# Patient Record
Sex: MALE | NOT HISPANIC OR LATINO | Employment: OTHER | URBAN - METROPOLITAN AREA
[De-identification: names, ages, dates, MRNs, and addresses within clinical notes are randomized per-mention and may not be internally consistent; named-entity substitution may affect disease eponyms.]

---

## 2023-08-01 ENCOUNTER — HOSPITAL ENCOUNTER (OUTPATIENT)
Dept: DATA CONVERSION | Facility: HOSPITAL | Age: 38
Discharge: HOME | End: 2023-08-01

## 2023-08-01 DIAGNOSIS — G47.33 OBSTRUCTIVE SLEEP APNEA (ADULT) (PEDIATRIC): ICD-10-CM

## 2023-08-01 DIAGNOSIS — G47.10 HYPERSOMNIA, UNSPECIFIED: ICD-10-CM

## 2024-01-09 ENCOUNTER — OFFICE VISIT (OUTPATIENT)
Dept: UROLOGY | Facility: CLINIC | Age: 39
End: 2024-01-09
Payer: COMMERCIAL

## 2024-01-09 DIAGNOSIS — R68.82 DECREASED LIBIDO: Primary | ICD-10-CM

## 2024-01-09 PROCEDURE — 99204 OFFICE O/P NEW MOD 45 MIN: CPT | Performed by: NURSE PRACTITIONER

## 2024-01-09 NOTE — PROGRESS NOTES
"UROLOGIC INITIAL EVALUATION     PROBLEM LIST:  1. Decreased libido  Testosterone           HISTORY OF PRESENT ILLNESS:   Tim Greenwood is a 38 y.o. with AUGUST  Presents today for evaluation and management of fatigue & decreased libido  Reports fatigue, low sex drive x 3 years  Variable ability to get and maintain erections  Works as a , currently on \"carnivore diet\"   Gets about 7 hrs of sleep/night, not feeling rested  Napping when possible  On CPAP since November for sleep apnea  Sleeping better but no improvement in fatigue  Wakes up only to void, NTF 1-2  Hydrating throughout the day, urine is clear  Denies depression  Hx vasectomy    3 kids - ages 5, 6, 11; youngest has cerebral palsy     PAST MEDICAL HISTORY:  No past medical history on file.    PAST SURGICAL HISTORY:  Past Surgical History:   Procedure Laterality Date    OTHER SURGICAL HISTORY  01/05/2022    Vasectomy        ALLERGIES:   Not on File     MEDICATIONS:   No current outpatient medications on file prior to visit.     No current facility-administered medications on file prior to visit.        SOCIAL HISTORY:  Patient     Social History     Socioeconomic History    Marital status: Single     Spouse name: Not on file    Number of children: Not on file    Years of education: Not on file    Highest education level: Not on file   Occupational History    Not on file   Tobacco Use    Smoking status: Not on file    Smokeless tobacco: Not on file   Substance and Sexual Activity    Alcohol use: Not on file    Drug use: Not on file    Sexual activity: Not on file   Other Topics Concern    Not on file   Social History Narrative    Not on file     Social Determinants of Health     Financial Resource Strain: Not on file   Food Insecurity: Not on file   Transportation Needs: Not on file   Physical Activity: Not on file   Stress: Not on file   Social Connections: Not on file   Intimate Partner Violence: Not on file   Housing Stability: Not " on file       FAMILY HISTORY:  No family history on file.    REVIEW OF SYSTEMS:  All systems reviewed, pertinent negatives as noted in HPI.     PHYSICAL EXAM:  There were no vitals taken for this visit.  Constitutional: Well-developed and well-nourished. No distress.    Head: Normocephalic and atraumatic.    Neck: Normal range of motion.     Pulmonary/Chest: Effort normal. No respiratory distress.   Abdominal: Non-distended.  : Deferred.  Integumentary: No rash or lesions visualized.  Musculoskeletal: Normal range of motion.    Neurological: Alert and oriented.  Psychiatric: Normal mood and affect. Thought content normal.      LABORATORY REVIEW:   Lab Results   Component Value Date    BUN 28 (H) 01/06/2023    CREATININE 0.8 01/06/2023    EGFR 115 01/06/2023     01/06/2023    K 4.2 01/06/2023     01/06/2023    CO2 25 01/06/2023    CALCIUM 9.3 01/06/2023      Lab Results   Component Value Date    WBC 3.6 (L) 01/06/2023    RBC 4.64 01/06/2023    HGB 14.1 01/06/2023    HCT 42.1 01/06/2023    MCV 90.7 01/06/2023    MCH 30.4 01/06/2023    MCHC 33.5 01/06/2023     01/06/2023    MPV 9.9 01/06/2023           Assessment:      1. Decreased libido  Testosterone          Tim Greenwood is a 38 y.o. with decreased energy levels, low sex drive x 3 years; JACOB 16 reflecting moderate sexual dysfunction  Testosterone 647 on last labs 1/6/23; not suggestive of hypogonadism  TSH, iron, and CBC also WNL  Has follow up with sleep medicine next week     Plan:   Recheck testosterone, aware this needs to be completed before 10 am  Will continue to work with PCP and other specialists on other possible causes  Discussed availability of psychologist for counselling if desired  RTC pending above results  Encouraged to contact us in the interim with any questions, concerns

## 2024-01-10 ENCOUNTER — LAB (OUTPATIENT)
Dept: LAB | Facility: LAB | Age: 39
End: 2024-01-10
Payer: COMMERCIAL

## 2024-01-10 DIAGNOSIS — R68.82 DECREASED LIBIDO: ICD-10-CM

## 2024-01-10 LAB — TESTOST SERPL-MCNC: 765 NG/DL (ref 240–1000)

## 2024-01-10 PROCEDURE — 84403 ASSAY OF TOTAL TESTOSTERONE: CPT

## 2024-01-10 PROCEDURE — 36415 COLL VENOUS BLD VENIPUNCTURE: CPT

## 2024-02-07 ENCOUNTER — OFFICE VISIT (OUTPATIENT)
Dept: PRIMARY CARE | Facility: CLINIC | Age: 39
End: 2024-02-07
Payer: COMMERCIAL

## 2024-02-07 VITALS
HEART RATE: 74 BPM | BODY MASS INDEX: 26.92 KG/M2 | WEIGHT: 188 LBS | OXYGEN SATURATION: 100 % | SYSTOLIC BLOOD PRESSURE: 102 MMHG | DIASTOLIC BLOOD PRESSURE: 68 MMHG | HEIGHT: 70 IN

## 2024-02-07 DIAGNOSIS — Z13.6 SCREENING FOR HEART DISEASE: ICD-10-CM

## 2024-02-07 DIAGNOSIS — G47.33 OSA ON CPAP: ICD-10-CM

## 2024-02-07 DIAGNOSIS — Z00.00 WELL ADULT EXAM: Primary | ICD-10-CM

## 2024-02-07 DIAGNOSIS — D72.818 OTHER DECREASED WHITE BLOOD CELL (WBC) COUNT: ICD-10-CM

## 2024-02-07 DIAGNOSIS — R53.83 OTHER FATIGUE: ICD-10-CM

## 2024-02-07 PROCEDURE — 1036F TOBACCO NON-USER: CPT | Performed by: NURSE PRACTITIONER

## 2024-02-07 PROCEDURE — 99396 PREV VISIT EST AGE 40-64: CPT | Performed by: NURSE PRACTITIONER

## 2024-02-07 ASSESSMENT — PATIENT HEALTH QUESTIONNAIRE - PHQ9
SUM OF ALL RESPONSES TO PHQ9 QUESTIONS 1 AND 2: 0
1. LITTLE INTEREST OR PLEASURE IN DOING THINGS: NOT AT ALL
2. FEELING DOWN, DEPRESSED OR HOPELESS: NOT AT ALL

## 2024-02-07 ASSESSMENT — PROMIS GLOBAL HEALTH SCALE
RATE_AVERAGE_PAIN: 5
RATE_QUALITY_OF_LIFE: GOOD
RATE_SOCIAL_SATISFACTION: VERY GOOD
EMOTIONAL_PROBLEMS: SOMETIMES
RATE_MENTAL_HEALTH: GOOD
RATE_AVERAGE_FATIGUE: SEVERE
CARRYOUT_PHYSICAL_ACTIVITIES: COMPLETELY
CARRYOUT_SOCIAL_ACTIVITIES: VERY GOOD
RATE_GENERAL_HEALTH: VERY GOOD
RATE_PHYSICAL_HEALTH: EXCELLENT

## 2024-02-07 ASSESSMENT — PAIN SCALES - GENERAL: PAINLEVEL: 0-NO PAIN

## 2024-02-07 NOTE — PROGRESS NOTES
"Subjective   Patient ID: Tim Greenwood is a 38 y.o. male who presents for CPE.    THI Camejo is a 38-year-old male patient who presents for CPE  Patient has family history of esophageal cancer ( father)    Diet is good. Patient works as  and eats very clean and healthy. Takes multivitamin pack daily along with whey protein  As , patient exercises multiple times per day  Former smoker    Pt has been diagnosed with AUGUST and is on CPAP  Continues to have daytime fatigue, worse in the afternoon. Has follow up tomorrow with Dr Castillo (sleep specialist)    Seeing urology for \"men's health\"  Dr Paz  Has low libido and ED  Testosterone was checked and normal    Pt has seen hematology in the past for low wbc (Dr Friedman)  States work up was normal    Review of Systems  Constitutional Symptoms: negative for fever, loss of appetite, headaches, Positive for fatigue.   Eyes: negative for loss and blurring of vision, double vision.   Ear, Nose, Mouth, Throat: negative for hearing loss, tinnitus, nasal congestion, rhinorrhea, nose bleeds, teeth problems, mouth sores, gum disease, dysphagia, sore throat.   Cardiovascular: negative for chest pain/pressure, palpitations, edema, claudication.   Respiratory: negative for shortness of breath, dyspnea on exertion, pain with breathing, coughing.   Breast: negative for tenderness, masses, gynecomastia.   Gastrointestinal: negative for anorexia, indigestion, nausea, vomiting, abdominal pain, change in bowel habits, diarrhea, constipation, hematochezia, melena, blood in stool.    : Negative for urinary or  penile complaints  Musculoskeletal: negative for joint pain, joint swelling, myalgia, cramps.   Integumentary: negative for change in mole, skin trouble or rash.   Neurological: negative for headache, numbness, tingling, weakness, tremors.   Psychiatric: negative for depression, anxiety.   Endocrine: negative for weight gain, heat or cold " "intolerance, polyuria, polydipsia, polyphagia.   Hematologic/Lymphatic: negative for bruising, abnormal bleeding, swollen glands      Objective   /68   Pulse 74   Ht 1.765 m (5' 9.5\")   Wt 85.3 kg (188 lb)   SpO2 100%   BMI 27.36 kg/m²     Physical Exam  General Appearance: Vital Signs have been reviewed. Comfortable. Well nourished, and well developed. He is awake, alert, and oriented and appears his stated age. The patient is cooperative with exam.  Head: Hair pattern reveals a normal pattern for patient's age and The face shows no abnormalities.  Eyes: PERRLA, EOMI, conjunctiva and sclera clear. Extraocular muscle exam reveals EOMI.  Ears, Nose, Mouth, and Throat: EARS: External bilateral ears reveals normal helix, tragus and ear lobe. Bilateral canals are normal. Both tympanic membranes are pearly gray and landmarks normal.   NOSE: Nasal mucosa in both nostrils reveals no polyps, ulcerations, or lesions. Teeth reveal good repair. Posterior pharynx reveals no abnormalities.  Neck: Neck reveals supple, no adenopathy, no thyromegaly, or carotid bruits.  Chest: Lungs are clear to auscultation bilaterally with no wheezes, rales, or rhonchi.  Cardiovascular: RRR without MRG. Bilateral DP pulses are 2+. Extremities reveal no cyanosis, clubbing, or edema.  Abdomen: Abdomen is soft, NT, ND with no masses.  Genitourinary: deferred - sees urology  Musculoskeletal: 5/5 and equal strength in bilateral upper and lower extremities.  Skin: Skin reveals good turgor and no rashes.  Neurological:  Intact and non-focal.  Psychiatric: Patient has appropriate judgement. Patient has good insight. Patient's mood is appropriate.    Assessment/Plan   Diagnoses and all orders for this visit:  Well adult exam  -     CBC and Auto Differential; Future  -     Comprehensive Metabolic Panel; Future  -     Lipid Panel; Future  -     Hemoglobin A1C; Future  -     TSH with reflex to Free T4 if abnormal; Future  Healthy " diet  Exercise  Safety  Bw ordered for further evaluation  See specialists as scheduled  Follow up based on results of bw  AUGUST on CPAP  Other fatigue  -     CBC and Auto Differential; Future  -     Comprehensive Metabolic Panel; Future  -     TSH with reflex to Free T4 if abnormal; Future  Other decreased white blood cell (WBC) count  -     CBC and Auto Differential; Future  -     Comprehensive Metabolic Panel; Future  -     TSH with reflex to Free T4 if abnormal; Future  Screening for heart disease  -     Lipid Panel; Future

## 2024-02-08 ENCOUNTER — OFFICE VISIT (OUTPATIENT)
Dept: SLEEP MEDICINE | Facility: CLINIC | Age: 39
End: 2024-02-08
Payer: COMMERCIAL

## 2024-02-08 VITALS
BODY MASS INDEX: 27.4 KG/M2 | HEART RATE: 80 BPM | DIASTOLIC BLOOD PRESSURE: 54 MMHG | WEIGHT: 185 LBS | OXYGEN SATURATION: 98 % | HEIGHT: 69 IN | SYSTOLIC BLOOD PRESSURE: 108 MMHG

## 2024-02-08 DIAGNOSIS — G47.19 EXCESSIVE DAYTIME SLEEPINESS: ICD-10-CM

## 2024-02-08 DIAGNOSIS — G47.33 OBSTRUCTIVE SLEEP APNEA (ADULT) (PEDIATRIC): Primary | ICD-10-CM

## 2024-02-08 PROCEDURE — 1036F TOBACCO NON-USER: CPT | Performed by: INTERNAL MEDICINE

## 2024-02-08 PROCEDURE — 99213 OFFICE O/P EST LOW 20 MIN: CPT | Performed by: INTERNAL MEDICINE

## 2024-02-08 ASSESSMENT — SLEEP AND FATIGUE QUESTIONNAIRES
HOW LIKELY ARE YOU TO NOD OFF OR FALL ASLEEP WHEN YOU ARE A PASSENGER IN A CAR FOR AN HOUR WITHOUT A BREAK: HIGH CHANCE OF DOZING
HOW LIKELY ARE YOU TO NOD OFF OR FALL ASLEEP WHILE WATCHING TV: MODERATE CHANCE OF DOZING
HOW LIKELY ARE YOU TO NOD OFF OR FALL ASLEEP WHILE SITTING AND READING: HIGH CHANCE OF DOZING
SITING INACTIVE IN A PUBLIC PLACE LIKE A CLASS ROOM OR A MOVIE THEATER: WOULD NEVER DOZE
HOW LIKELY ARE YOU TO NOD OFF OR FALL ASLEEP WHILE SITTING AND TALKING TO SOMEONE: WOULD NEVER DOZE
ESS-CHAD TOTAL SCORE: 14
HOW LIKELY ARE YOU TO NOD OFF OR FALL ASLEEP WHILE LYING DOWN TO REST IN THE AFTERNOON WHEN CIRCUMSTANCES PERMIT: HIGH CHANCE OF DOZING
HOW LIKELY ARE YOU TO NOD OFF OR FALL ASLEEP IN A CAR, WHILE STOPPED FOR A FEW MINUTES IN TRAFFIC: WOULD NEVER DOZE
HOW LIKELY ARE YOU TO NOD OFF OR FALL ASLEEP WHILE SITTING QUIETLY AFTER LUNCH WITHOUT ALCOHOL: HIGH CHANCE OF DOZING

## 2024-02-08 ASSESSMENT — PAIN SCALES - GENERAL: PAINLEVEL: 0-NO PAIN

## 2024-02-08 NOTE — ASSESSMENT & PLAN NOTE
- Tim Greenwood  has sleep apnea and requires treatment.  - Tim Greenwood demonstrates good compliance and benefit from PAP therapy  - Continue Auto PAP 5-15 cmH20 through [facility].  - Order for renewal of PAP supplies placed

## 2024-02-08 NOTE — ASSESSMENT & PLAN NOTE
Continues to demonstrate excessive daytime sleepiness despite sufficient use of his CPAP. Will allow 3 more months of consistent CPAP use and if he still demonstrates hypersomnolence then we discussed doing PSG/MSLT testing to rule out hypersomnolence condition such as narcolepsy

## 2024-02-08 NOTE — PROGRESS NOTES
"  Subjective   Patient ID: Tim Greenwood is a 38 y.o. male who presents for Sleep Apnea, Pap Adherence Followup, and Needs Pap Supplies/new Pap Machine.  HPI    Prior Sleep History:  PSG 8/1/2023, AHI 14.4, O2 saturation herman of 87.6   09/11/2023 TIM is here to discuss the results of his sleep study. Night sleep symptoms: Snoring, nocturnal awakenings 3-4 times a night, waking up gasping or choking, nocturia, night sweats, bruxism, sore throat, dry mouth in the morning, morning headaches. Reports urge to move legs in the morning, occurs during the night or in the evening, movement provides partial relief, worse with rest or inactivity, dreams upon falling asleep, sleep paralysis, denies cataplexy   Daytime symptoms difficulty with concentration, difficulty with irritability, sleepy when driving  He has not noticed an association with the marijuana, CBN or CBD.  He believes his symptoms started prior to his marijuana use     He reports COVID around Thanksgiving 2021     He has a history of boxing and history of concussion playing football, but denies serious head injury     Current Sleep History:      A downloaded compliance report was reviewed and was interpreted by myself as follows:  > 4 hour compliance was 97%, with an average use of 8 hours and 26 minutes, with a residual AHI 3.9 on AutoPap 5 to 15 cm H2O.  95th percentile pressure settings of 12, maximum 14.  95th percentile leak is 16.1.  Maximum leak 115.   He is using a full face mask traditional [employer] Simplus medium mask    Tim Greenwood reports good benefit from his device. Better sleep quality, but still experiencing excessive daytime sleepiness and needs to take a 1-2 hour nap every day    ESS: 14     Review of Systems  Review of systems negative except as per HPI  Objective   /54   Pulse 80   Ht 1.753 m (5' 9\")   Wt 83.9 kg (185 lb)   SpO2 98%   BMI 27.32 kg/m²    PREVIOUS WEIGHTS:  Wt Readings from Last 3 " Encounters:   02/08/24 83.9 kg (185 lb)   02/07/24 85.3 kg (188 lb)   09/11/23 81.6 kg (180 lb)       Physical Exam  PHYSICAL EXAM: GENERAL: alert pleasant and cooperative no acute distress  PSYCH EXAM: alert,oriented, in NAD with a full range of affect, normal behavior and no psychotic features    Lab Results   Component Value Date    IRON 88 01/06/2023    TIBC 277 01/06/2023    FERRITIN 119 01/31/2018        Assessment/Plan   Problem List Items Addressed This Visit             ICD-10-CM    Obstructive sleep apnea (adult) (pediatric) - Primary G47.33     - Tim Greenwood  has sleep apnea and requires treatment.  - Tim Greenwood demonstrates good compliance and benefit from PAP therapy  - Continue Auto PAP 5-15 cmH20 through [facility].  - Order for renewal of PAP supplies placed          Relevant Orders    Positive Airway Pressure (PAP) Therapy    Excessive daytime sleepiness G47.19     Continues to demonstrate excessive daytime sleepiness despite sufficient use of his CPAP. Will allow 3 more months of consistent CPAP use and if he still demonstrates hypersomnolence then we discussed doing PSG/MSLT testing to rule out hypersomnolence condition such as narcolepsy

## 2024-02-10 ENCOUNTER — LAB (OUTPATIENT)
Dept: LAB | Facility: LAB | Age: 39
End: 2024-02-10
Payer: COMMERCIAL

## 2024-02-10 DIAGNOSIS — Z13.6 SCREENING FOR HEART DISEASE: ICD-10-CM

## 2024-02-10 DIAGNOSIS — D72.818 OTHER DECREASED WHITE BLOOD CELL (WBC) COUNT: ICD-10-CM

## 2024-02-10 DIAGNOSIS — Z00.00 WELL ADULT EXAM: ICD-10-CM

## 2024-02-10 DIAGNOSIS — R53.83 OTHER FATIGUE: ICD-10-CM

## 2024-02-10 LAB
ALBUMIN SERPL-MCNC: 4.4 G/DL (ref 3.5–5)
ALP BLD-CCNC: 60 U/L (ref 35–125)
ALT SERPL-CCNC: 35 U/L (ref 5–40)
ANION GAP SERPL CALC-SCNC: 10 MMOL/L
AST SERPL-CCNC: 29 U/L (ref 5–40)
BASOPHILS # BLD AUTO: 0.02 X10*3/UL (ref 0–0.1)
BASOPHILS NFR BLD AUTO: 0.6 %
BILIRUB SERPL-MCNC: 0.9 MG/DL (ref 0.1–1.2)
BUN SERPL-MCNC: 24 MG/DL (ref 8–25)
CALCIUM SERPL-MCNC: 9.1 MG/DL (ref 8.5–10.4)
CHLORIDE SERPL-SCNC: 103 MMOL/L (ref 97–107)
CHOLEST SERPL-MCNC: 208 MG/DL (ref 133–200)
CHOLEST/HDLC SERPL: 2.9 {RATIO}
CO2 SERPL-SCNC: 26 MMOL/L (ref 24–31)
CREAT SERPL-MCNC: 0.9 MG/DL (ref 0.4–1.6)
EGFRCR SERPLBLD CKD-EPI 2021: >90 ML/MIN/1.73M*2
EOSINOPHIL # BLD AUTO: 0.07 X10*3/UL (ref 0–0.7)
EOSINOPHIL NFR BLD AUTO: 2.2 %
ERYTHROCYTE [DISTWIDTH] IN BLOOD BY AUTOMATED COUNT: 14.4 % (ref 11.5–14.5)
EST. AVERAGE GLUCOSE BLD GHB EST-MCNC: 111 MG/DL
GLUCOSE SERPL-MCNC: 96 MG/DL (ref 65–99)
HBA1C MFR BLD: 5.5 %
HCT VFR BLD AUTO: 44.2 % (ref 41–52)
HDLC SERPL-MCNC: 71 MG/DL
HGB BLD-MCNC: 14.9 G/DL (ref 13.5–17.5)
IMM GRANULOCYTES # BLD AUTO: 0 X10*3/UL (ref 0–0.7)
IMM GRANULOCYTES NFR BLD AUTO: 0 % (ref 0–0.9)
LDLC SERPL CALC-MCNC: 127 MG/DL (ref 65–130)
LYMPHOCYTES # BLD AUTO: 1.46 X10*3/UL (ref 1.2–4.8)
LYMPHOCYTES NFR BLD AUTO: 46.1 %
MCH RBC QN AUTO: 29.8 PG (ref 26–34)
MCHC RBC AUTO-ENTMCNC: 33.7 G/DL (ref 32–36)
MCV RBC AUTO: 88 FL (ref 80–100)
MONOCYTES # BLD AUTO: 0.38 X10*3/UL (ref 0.1–1)
MONOCYTES NFR BLD AUTO: 12 %
NEUTROPHILS # BLD AUTO: 1.24 X10*3/UL (ref 1.2–7.7)
NEUTROPHILS NFR BLD AUTO: 39.1 %
NRBC BLD-RTO: 0 /100 WBCS (ref 0–0)
PLATELET # BLD AUTO: 209 X10*3/UL (ref 150–450)
POTASSIUM SERPL-SCNC: 4.9 MMOL/L (ref 3.4–5.1)
PROT SERPL-MCNC: 6.7 G/DL (ref 5.9–7.9)
RBC # BLD AUTO: 5 X10*6/UL (ref 4.5–5.9)
SODIUM SERPL-SCNC: 139 MMOL/L (ref 133–145)
TRIGL SERPL-MCNC: 49 MG/DL (ref 40–150)
TSH SERPL DL<=0.05 MIU/L-ACNC: 1.65 MIU/L (ref 0.27–4.2)
WBC # BLD AUTO: 3.2 X10*3/UL (ref 4.4–11.3)

## 2024-02-10 PROCEDURE — 83036 HEMOGLOBIN GLYCOSYLATED A1C: CPT

## 2024-02-10 PROCEDURE — 84443 ASSAY THYROID STIM HORMONE: CPT

## 2024-02-10 PROCEDURE — 36415 COLL VENOUS BLD VENIPUNCTURE: CPT

## 2024-02-10 PROCEDURE — 80061 LIPID PANEL: CPT

## 2024-02-10 PROCEDURE — 85025 COMPLETE CBC W/AUTO DIFF WBC: CPT

## 2024-02-10 PROCEDURE — 80053 COMPREHEN METABOLIC PANEL: CPT

## 2024-05-09 ENCOUNTER — OFFICE VISIT (OUTPATIENT)
Dept: SLEEP MEDICINE | Facility: CLINIC | Age: 39
End: 2024-05-09
Payer: COMMERCIAL

## 2024-05-09 VITALS
OXYGEN SATURATION: 98 % | HEIGHT: 70 IN | SYSTOLIC BLOOD PRESSURE: 112 MMHG | BODY MASS INDEX: 27.49 KG/M2 | HEART RATE: 73 BPM | DIASTOLIC BLOOD PRESSURE: 68 MMHG | WEIGHT: 192 LBS

## 2024-05-09 DIAGNOSIS — G47.10 HYPERSOMNOLENCE DISORDER: ICD-10-CM

## 2024-05-09 DIAGNOSIS — G47.33 OBSTRUCTIVE SLEEP APNEA (ADULT) (PEDIATRIC): Primary | ICD-10-CM

## 2024-05-09 PROCEDURE — 1036F TOBACCO NON-USER: CPT | Performed by: INTERNAL MEDICINE

## 2024-05-09 PROCEDURE — 99214 OFFICE O/P EST MOD 30 MIN: CPT | Performed by: INTERNAL MEDICINE

## 2024-05-09 RX ORDER — MODAFINIL 100 MG/1
100 TABLET ORAL DAILY
Qty: 14 TABLET | Refills: 0 | Status: SHIPPED | OUTPATIENT
Start: 2024-05-09 | End: 2024-05-23

## 2024-05-09 RX ORDER — BISMUTH SUBSALICYLATE 262 MG
1 TABLET,CHEWABLE ORAL DAILY
COMMUNITY

## 2024-05-09 ASSESSMENT — SLEEP AND FATIGUE QUESTIONNAIRES
HOW LIKELY ARE YOU TO NOD OFF OR FALL ASLEEP WHEN YOU ARE A PASSENGER IN A CAR FOR AN HOUR WITHOUT A BREAK: HIGH CHANCE OF DOZING
HOW LIKELY ARE YOU TO NOD OFF OR FALL ASLEEP WHILE LYING DOWN TO REST IN THE AFTERNOON WHEN CIRCUMSTANCES PERMIT: HIGH CHANCE OF DOZING
SITING INACTIVE IN A PUBLIC PLACE LIKE A CLASS ROOM OR A MOVIE THEATER: SLIGHT CHANCE OF DOZING
HOW LIKELY ARE YOU TO NOD OFF OR FALL ASLEEP IN A CAR, WHILE STOPPED FOR A FEW MINUTES IN TRAFFIC: MODERATE CHANCE OF DOZING
HOW LIKELY ARE YOU TO NOD OFF OR FALL ASLEEP WHILE SITTING QUIETLY AFTER LUNCH WITHOUT ALCOHOL: MODERATE CHANCE OF DOZING
ESS-CHAD TOTAL SCORE: 17
HOW LIKELY ARE YOU TO NOD OFF OR FALL ASLEEP WHILE WATCHING TV: MODERATE CHANCE OF DOZING
HOW LIKELY ARE YOU TO NOD OFF OR FALL ASLEEP WHILE SITTING AND TALKING TO SOMEONE: SLIGHT CHANCE OF DOZING
HOW LIKELY ARE YOU TO NOD OFF OR FALL ASLEEP WHILE SITTING AND READING: HIGH CHANCE OF DOZING

## 2024-05-09 ASSESSMENT — PAIN SCALES - GENERAL: PAINLEVEL: 0-NO PAIN

## 2024-05-09 NOTE — ASSESSMENT & PLAN NOTE
Continues to have persistent hypersomnolence despite optimal use and control of sleep apnea. Will pursue PSG/MSLT testing. Sleep diaries provided and recommended to hand to the tech on day of testing. UDS ordered. Follow-up after testing. I did provide him with a 2 week supply of modafinil, that I recommended he does not take for the 2 weeks leading up to his sleep study. Discussed potential side effects of the medication. Instructed him to discontinue marijuana use as it can mimic hypersomnolence disorders. He reports that he rarely uses marijuana anymore

## 2024-05-09 NOTE — PROGRESS NOTES
"  Subjective   Patient ID: Tim Greenwood is a 38 y.o. male who presents for Sleep Apnea and Pap Adherence Followup.  HPI    Prior Sleep History:  PSG 8/1/2023, AHI 14.4, O2 saturation herman of 87.6   09/11/2023 TIM is here to discuss the results of his sleep study. Night sleep symptoms: Snoring, nocturnal awakenings 3-4 times a night, waking up gasping or choking, nocturia, night sweats, bruxism, sore throat, dry mouth in the morning, morning headaches. Reports urge to move legs in the morning, occurs during the night or in the evening, movement provides partial relief, worse with rest or inactivity, dreams upon falling asleep, sleep paralysis, denies cataplexy.  Daytime symptoms difficulty with concentration, difficulty with irritability, sleepy when driving  He has not noticed an association with the marijuana, CBN or CBD. He believes his symptoms started prior to his marijuana use.    Current Sleep History:  Continues to experience hypersomnolence despite optimal use of PAP therapy  A downloaded compliance report was reviewed and was interpreted by myself as follows:  > 4 hour compliance was 90 %, with an average use of 8 hours and 32 minutes, with a residual AHI 3.8 .     Tim Greenwood reports  good benefit from his device, but still experiences excessive daytime sleepiness      ESS: 17     Review of Systems  Review of systems negative except as per HPI  Objective   /68   Pulse 73   Ht 1.778 m (5' 10\")   Wt 87.1 kg (192 lb)   SpO2 98%   BMI 27.55 kg/m²    PREVIOUS WEIGHTS:  Wt Readings from Last 3 Encounters:   05/09/24 87.1 kg (192 lb)   02/08/24 83.9 kg (185 lb)   02/07/24 85.3 kg (188 lb)       Physical Exam  PHYSICAL EXAM: GENERAL: alert pleasant and cooperative no acute distress  PSYCH EXAM: alert,oriented, in NAD with a full range of affect, normal behavior and no psychotic features    Lab Results   Component Value Date    IRON 88 01/06/2023    TIBC 277 01/06/2023    " FERRITIN 119 01/31/2018        Assessment/Plan   Problem List Items Addressed This Visit             ICD-10-CM    Obstructive sleep apnea (adult) (pediatric) - Primary G47.33     - Tim Greenwood  has sleep apnea and requires treatment.  - Tim Greenwood demonstrates good compliance and benefit from PAP therapy  - Continue Auto PAP 5-15 cmH2O cmH20 through [facility].  - Order for renewal of PAP supplies placed          Relevant Orders    Positive Airway Pressure (PAP) Therapy    Hypersomnolence disorder G47.10     Continues to have persistent hypersomnolence despite optimal use and control of sleep apnea. Will pursue PSG/MSLT testing. Sleep diaries provided and recommended to hand to the tech on day of testing. UDS ordered. Follow-up after testing. I did provide him with a 2 week supply of modafinil, that I recommended he does not take for the 2 weeks leading up to his sleep study. Discussed potential side effects of the medication. Instructed him to discontinue marijuana use as it can mimic hypersomnolence disorders. He reports that he rarely uses marijuana anymore         Relevant Medications    modafinil (Provigil) 100 mg tablet    Other Relevant Orders    In-Center Sleep Study (Sleep Provider Only)    Multiple sleep latency test (Sleep Provider Only)    Drug Screen, Urine With Reflex to Confirmation

## 2024-05-09 NOTE — ASSESSMENT & PLAN NOTE
- Tim Greenwood  has sleep apnea and requires treatment.  - Tim Greenwood demonstrates good compliance and benefit from PAP therapy  - Continue Auto PAP 5-15 cmH2O cmH20 through [facility].  - Order for renewal of PAP supplies placed

## 2024-05-31 ENCOUNTER — APPOINTMENT (OUTPATIENT)
Dept: SLEEP MEDICINE | Facility: CLINIC | Age: 39
End: 2024-05-31
Payer: COMMERCIAL

## 2024-06-20 ENCOUNTER — TELEPHONE (OUTPATIENT)
Dept: SLEEP MEDICINE | Facility: CLINIC | Age: 39
End: 2024-06-20
Payer: COMMERCIAL

## 2024-06-20 DIAGNOSIS — G47.10 HYPERSOMNOLENCE DISORDER: ICD-10-CM

## 2024-06-20 RX ORDER — MODAFINIL 100 MG/1
100 TABLET ORAL DAILY
Qty: 14 TABLET | Refills: 0 | Status: SHIPPED | OUTPATIENT
Start: 2024-06-20 | End: 2024-07-04

## 2024-06-20 NOTE — TELEPHONE ENCOUNTER
----- Message from Mely Jim sent at 6/20/2024  2:40 PM EDT -----  Regarding: Medication Refill  Patient states he can't get Sleep Study Scheduled until August and the modafinil seemed to help would like to know if he can have an extension on the script

## 2024-07-11 DIAGNOSIS — G47.10 HYPERSOMNOLENCE DISORDER: ICD-10-CM

## 2024-07-11 RX ORDER — MODAFINIL 100 MG/1
100 TABLET ORAL DAILY
Qty: 14 TABLET | Refills: 0 | Status: SHIPPED | OUTPATIENT
Start: 2024-07-11 | End: 2024-07-25

## 2024-07-17 ENCOUNTER — APPOINTMENT (OUTPATIENT)
Dept: SLEEP MEDICINE | Facility: CLINIC | Age: 39
End: 2024-07-17
Payer: COMMERCIAL

## 2024-07-29 ENCOUNTER — APPOINTMENT (OUTPATIENT)
Dept: SLEEP MEDICINE | Facility: CLINIC | Age: 39
End: 2024-07-29
Payer: COMMERCIAL

## 2024-09-09 ENCOUNTER — APPOINTMENT (OUTPATIENT)
Dept: SLEEP MEDICINE | Facility: CLINIC | Age: 39
End: 2024-09-09
Payer: COMMERCIAL

## 2024-09-18 ENCOUNTER — APPOINTMENT (OUTPATIENT)
Dept: SLEEP MEDICINE | Facility: CLINIC | Age: 39
End: 2024-09-18
Payer: COMMERCIAL

## 2024-10-22 ENCOUNTER — PROCEDURE VISIT (OUTPATIENT)
Dept: SLEEP MEDICINE | Facility: CLINIC | Age: 39
End: 2024-10-22
Payer: COMMERCIAL

## 2024-10-22 DIAGNOSIS — G47.10 HYPERSOMNOLENCE DISORDER: ICD-10-CM

## 2024-10-23 ENCOUNTER — PROCEDURE VISIT (OUTPATIENT)
Dept: SLEEP MEDICINE | Facility: CLINIC | Age: 39
End: 2024-10-23
Payer: COMMERCIAL

## 2024-10-23 VITALS
SYSTOLIC BLOOD PRESSURE: 112 MMHG | WEIGHT: 191.8 LBS | RESPIRATION RATE: 14 BRPM | HEART RATE: 76 BPM | HEIGHT: 70 IN | OXYGEN SATURATION: 99 % | DIASTOLIC BLOOD PRESSURE: 68 MMHG | BODY MASS INDEX: 27.46 KG/M2

## 2024-10-23 DIAGNOSIS — G47.10 HYPERSOMNOLENCE DISORDER: ICD-10-CM

## 2024-10-23 LAB
AMPHETAMINES UR QL SCN: ABNORMAL
BARBITURATES UR QL SCN: ABNORMAL
BENZODIAZ UR QL SCN: ABNORMAL
BZE UR QL SCN: ABNORMAL
CANNABINOIDS UR QL SCN: ABNORMAL
FENTANYL+NORFENTANYL UR QL SCN: ABNORMAL
METHADONE UR QL SCN: ABNORMAL
OPIATES UR QL SCN: ABNORMAL
OXYCODONE+OXYMORPHONE UR QL SCN: ABNORMAL
PCP UR QL SCN: ABNORMAL

## 2024-10-23 PROCEDURE — 80307 DRUG TEST PRSMV CHEM ANLYZR: CPT

## 2024-10-23 PROCEDURE — 80349 CANNABINOIDS NATURAL: CPT | Performed by: INTERNAL MEDICINE

## 2024-10-23 ASSESSMENT — SLEEP AND FATIGUE QUESTIONNAIRES
HOW LIKELY ARE YOU TO NOD OFF OR FALL ASLEEP WHILE SITTING AND TALKING TO SOMEONE: SLIGHT CHANCE OF DOZING
HOW LIKELY ARE YOU TO NOD OFF OR FALL ASLEEP WHEN YOU ARE A PASSENGER IN A CAR FOR AN HOUR WITHOUT A BREAK: HIGH CHANCE OF DOZING
ESS-CHAD TOTAL SCORE: 15
HOW LIKELY ARE YOU TO NOD OFF OR FALL ASLEEP WHILE SITTING AND READING: MODERATE CHANCE OF DOZING
HOW LIKELY ARE YOU TO NOD OFF OR FALL ASLEEP WHILE LYING DOWN TO REST IN THE AFTERNOON WHEN CIRCUMSTANCES PERMIT: HIGH CHANCE OF DOZING
HOW LIKELY ARE YOU TO NOD OFF OR FALL ASLEEP WHILE WATCHING TV: MODERATE CHANCE OF DOZING
SITING INACTIVE IN A PUBLIC PLACE LIKE A CLASS ROOM OR A MOVIE THEATER: SLIGHT CHANCE OF DOZING
HOW LIKELY ARE YOU TO NOD OFF OR FALL ASLEEP IN A CAR, WHILE STOPPED FOR A FEW MINUTES IN TRAFFIC: SLIGHT CHANCE OF DOZING
HOW LIKELY ARE YOU TO NOD OFF OR FALL ASLEEP WHILE SITTING QUIETLY AFTER LUNCH WITHOUT ALCOHOL: MODERATE CHANCE OF DOZING

## 2024-10-23 NOTE — PROGRESS NOTES
[facility] TECH NOTE:     Patient: Tim Greenwood   MRN//AGE: [id number]  1985  38 y.o.   Technologist: Jeremiah Jackson   Room: 3   Service Date: 10/23/2024        Sleep Testing Location: [facility]: 15    TECHNOLOGIST SLEEP STUDY PROCEDURE NOTE:   This sleep study is being conducted according to the policies and procedures outlined by the AAS accreditation standards.  The sleep study procedure and processes involved during this appointment was explained to the patient/patient’s family, questions were answered. The patient/family verbalized understanding.      The patient is a 38 y.o. year old male scheduled for aMSLT/MWT with montage of:  MSLT . he arrived for his appointment.      The study that was ultimately completed was aMSLT/MWT with montage of:  MSLT .    The full study Was completed.  Patient questionnaires completed?: yes     Consents signed? yes    Brief Study observations: Patient requested and went outside at times.  He stated he is an active person and could not just sit in the lab all day.       Deviation to order/protocol and reason: Tech ran all naps using patient's home mask [employer] Simplus FFM medium at a CPAP pressure of 13cm.  Tech lowered pressure to hope to encourage sleep during the naps as patient stated he is on AutoPAP at home and usually starts at a lower pressure.  Patient was tolerate to start at 13cm.       Other:None    After the procedure, the patient/family was informed to ensure followup with ordering clinician for testing results.      Technologist: Jeremiah Jackson

## 2024-10-23 NOTE — PROGRESS NOTES
[facility] TECH NOTE:     Patient: Tim Greenwood   MRN//AGE: [id number]  1985  38 y.o.   Technologist: William Roger   Room: 3   Service Date: 10/23/2024        Sleep Testing Location: [facility]  Neck [address] 36 cm  [place]: 15    TECHNOLOGIST SLEEP STUDY PROCEDURE NOTE:   This sleep study is being conducted according to the policies and procedures outlined by the AAS accreditation standards.  The sleep study procedure and processes involved during this appointment was explained to the patient/patient’s family, questions were answered. The patient/family verbalized understanding.      The patient is a 38 y.o. year old male scheduled for aCPAP titration     The study that was ultimately completed was aCPAP titration     The full study Was completed.  Patient questionnaires completed?: yes   Consents signed? yes      Initial Fall Risk Screening:     Tim has not fallen in the last 6 months.  Tim does not have a fear of falling. He does not need assistance with sitting, standing, or walking. he does not need assistance walking in his home. he does not need assistance in an unfamiliar setting. The patient is notusing an assistive device.     Brief Study observations: This is a 38 year-old male here for a CPAP/MSLT Study.  The patient had a PSG performed at [facility] 2023 and was dx'ed with AUGUST. His AHI was 14.4.     Deviation to order/protocol and reason: None      If PAP, which was preferred mask/pressure/mode:  Mccloud [employer]kel Simplus size medium.      Other:None    After the procedure, the patient/family was informed to ensure followup with ordering clinician for testing results.      Technologist: William Roger

## 2024-10-29 LAB — CARBOXYTHC UR-MCNC: >500 NG/ML

## 2024-11-12 ENCOUNTER — APPOINTMENT (OUTPATIENT)
Dept: SLEEP MEDICINE | Facility: CLINIC | Age: 39
End: 2024-11-12
Payer: COMMERCIAL

## 2024-11-12 DIAGNOSIS — Z87.820 HISTORY OF MULTIPLE CONCUSSIONS: ICD-10-CM

## 2024-11-12 DIAGNOSIS — G47.10 HYPERSOMNOLENCE DISORDER: ICD-10-CM

## 2024-11-12 DIAGNOSIS — G47.33 OBSTRUCTIVE SLEEP APNEA (ADULT) (PEDIATRIC): ICD-10-CM

## 2024-11-12 DIAGNOSIS — R94.01 ABNORMAL EEG: Primary | ICD-10-CM

## 2024-11-12 DIAGNOSIS — Z79.899 MEDICATION MANAGEMENT: ICD-10-CM

## 2024-11-12 PROCEDURE — 1036F TOBACCO NON-USER: CPT | Performed by: INTERNAL MEDICINE

## 2024-11-12 PROCEDURE — 99214 OFFICE O/P EST MOD 30 MIN: CPT | Performed by: INTERNAL MEDICINE

## 2024-11-12 NOTE — LETTER
"2024     Shayla Berrios, APRN-CNP  [address] [place] [address]  [address]  [place] [address]    Patient: Bashir Greenwood   YOB: 1985   Date of Visit: 2024       Dear Dr. Shayla Berrios, APRN-CNP:    Thank you for referring Bashir Greenwood to me for evaluation. Below are my notes for this consultation.  If you have questions, please do not hesitate to call me. I look forward to following your patient along with you.       Sincerely,     Irwin Castillo MD      CC: No Recipients  ______________________________________________________________________________________         Patient: Tim Greenwood    [id number]  : 1985 -- AGE 39 y.o.    Provider: Irwin Castillo MD     Location [facility]   Service Date: 2024              [facility]  Followup Visit Note  Virtual or Telephone Consent  An interactive audio and video telecommunication system which permits real time communications between the patient (at the originating site) and provider (at the distant site) was utilized to provide this telehealth service.   Verbal consent was requested and obtained from Tim Greenwood on this date, 24 for a telehealth visit.      Subjective  Patient ID: Tim Greenwood \"León" is a 39 y.o. male who presents for Sleep Apnea, Pap Adherence Followup, and Review Sleep Study Results.  HPI    Prior Sleep History:  PSG 2023, AHI 14.4, O2 saturation herman of 87.6   2023 TIM is here to discuss the results of his sleep study. Night sleep symptoms: Snoring, nocturnal awakenings 3-4 times a night, waking up gasping or choking, nocturia, night sweats, bruxism, sore throat, dry mouth in the morning, morning headaches. Reports urge to move legs in the morning, occurs during the night or in the evening, movement provides partial relief, worse with rest or inactivity, dreams upon falling asleep, sleep paralysis, denies " "cataplexy.  Daytime symptoms difficulty with concentration, difficulty with irritability, sleepy when driving  He has not noticed an association with the marijuana, CBN or CBD. He believes his symptoms started prior to his marijuana use.     Continues to experience hypersomnolence despite optimal use of PAP therapy  A downloaded compliance report was reviewed and was interpreted by myself as follows:  > 4 hour compliance was 90 %, with an average use of 8 hours and 32 minutes, with a residual AHI 3.8 .      Tim Greenwood reports  good benefit from his device, but still experiences excessive daytime sleepiness        ESS: 17     Current Sleep History:  Tim \"Bashir\" presents for review of sleep study and discuss management.  He had PSG on PAP therapy followed by MSLT.    10/22/2024: Diagnostic PSG on PAP therapy: Well-managed on current pressure of CPAP 14 cm H2O.  There were no significant periodic limb movements.  Patient fragmented sleep appear to be due to atypical EEG waveforms and stereotypical chewing  10/23/2024: Multiple sleep latency test: Patient had a mean sleep latency of 16.1 with 0 sleep onset REM periods.  Sleep logs were not available for review.  Urine drug screen was positive for marijuana, which the patient has previously disclosed    His youngest daughter had absence seizures when she was little. No other knowledge of absence seizures. His daughter has cerebral palsy and born very premature  ESS: 15     A downloaded compliance report was reviewed and was interpreted by myself as follows:  > 4 hour compliance was 90%, with an average use of 7 hours and 37 minutes, with a residual AHI 2.3 on AutoPap 5 to 15 cm H2O.  95th percentile pressure of 11 cm H2O.  He wears a traditional fullface mask occasionally disconnects from the hose and leaks.  He said it is infrequent but he does continue to find some benefit from PAP therapy although continues to demonstrate persistent hypersomnolence.  " "He is not having any daytime seizure activity.     Tim Greenwood \"Bashir\" reports good benefit from his device.   Review of Systems  Review of systems negative except as per HPI  Objective  There were no vitals taken for this visit.   PREVIOUS WEIGHTS:  Wt Readings from Last 3 Encounters:   10/22/24 87 kg (191 lb 12.8 oz)   05/09/24 87.1 kg (192 lb)   02/08/24 83.9 kg (185 lb)     Physical Exam  PHYSICAL EXAM: GENERAL: alert pleasant and cooperative no acute distress  PSYCH EXAM: alert,oriented, in NAD with a full range of affect, normal behavior and no psychotic features    Lab Results   Component Value Date    IRON 88 01/06/2023    TIBC 277 01/06/2023    FERRITIN 119 01/31/2018        Assessment/Plan  Problem List Items Addressed This Visit             ICD-10-CM    Obstructive sleep apnea (adult) (pediatric) G47.33     Diagnostic PSG on PAP therapy indicated that CPAP was effective at 14 cm H2O         Hypersomnolence disorder G47.10     Review of clinical studies indicate that modafinil is safe in patients with seizure disorder.  He had good clinical benefit with the modafinil.  We did discuss this in the visit.  He did have a positive urine drug screen for marijuana.  Patient reports that he has stopped using marijuana and would like to continue with modafinil.  We did discuss that that would be fine provided that he has a negative urine drug screen which we will order and request that he complete this in the next 72 hours.  Urine drug screen is ordered.  Recommend follow-up in 1 month to review the results of the EEG.  Tim \"Bashir\"  verbalized understanding         Relevant Orders    EEG    History of multiple concussions Z87.820    Abnormal EEG - Primary R94.01     He had abnormal EEG the cause arousals during sleep.  He had stereotypical chewing movements that may be seizure activity.  We did discuss that it could also be artifact due to bruxism.  Patient has history of multiple concussions has " daytime sleepiness with a negative MSLT for narcolepsy, idiopathic hypersomnolence or hypersomnia due to obstructive sleep apnea.  This would fit a clinical history of nocturnal seizures.  Recommend we start with a daytime EEG.  If negative then we would move forward with a 72-hour ambulatory EEG and refer to neurology for further evaluation.         Relevant Orders    EEG

## 2024-11-12 NOTE — ASSESSMENT & PLAN NOTE
"Review of clinical studies indicate that modafinil is safe in patients with seizure disorder.  He had good clinical benefit with the modafinil.  We did discuss this in the visit.  He did have a positive urine drug screen for marijuana.  Patient reports that he has stopped using marijuana and would like to continue with modafinil.  We did discuss that that would be fine provided that he has a negative urine drug screen which we will order and request that he complete this in the next 72 hours.  Urine drug screen is ordered.  Recommend follow-up in 1 month to review the results of the EEG.  Tim \"Bashir\"  verbalized understanding  "

## 2024-11-12 NOTE — PROGRESS NOTES
"     Patient: Tim Greenwood    [id number]  : 1985 -- AGE 39 y.o.    Provider: Irwin Castillo MD     Location [facility]   Service Date: 2024              [facility]  Followup Visit Note  Virtual or Telephone Consent  An interactive audio and video telecommunication system which permits real time communications between the patient (at the originating site) and provider (at the distant site) was utilized to provide this telehealth service.   Verbal consent was requested and obtained from Tim Greenwood on this date, 24 for a telehealth visit.      Subjective   Patient ID: Tim Greenwood \"Bashir\" is a 39 y.o. male who presents for Sleep Apnea, Pap Adherence Followup, and Review Sleep Study Results.  HPI    Prior Sleep History:  PSG 2023, AHI 14.4, O2 saturation herman of 87.6   2023 TIM is here to discuss the results of his sleep study. Night sleep symptoms: Snoring, nocturnal awakenings 3-4 times a night, waking up gasping or choking, nocturia, night sweats, bruxism, sore throat, dry mouth in the morning, morning headaches. Reports urge to move legs in the morning, occurs during the night or in the evening, movement provides partial relief, worse with rest or inactivity, dreams upon falling asleep, sleep paralysis, denies cataplexy.  Daytime symptoms difficulty with concentration, difficulty with irritability, sleepy when driving  He has not noticed an association with the marijuana, CBN or CBD. He believes his symptoms started prior to his marijuana use.     Continues to experience hypersomnolence despite optimal use of PAP therapy  A downloaded compliance report was reviewed and was interpreted by myself as follows:  > 4 hour compliance was 90 %, with an average use of 8 hours and 32 minutes, with a residual AHI 3.8 .      Tim Greenwood reports  good benefit from his device, but still experiences " "excessive daytime sleepiness        ESS: 17     Current Sleep History:  Tim \"Bashir\" presents for review of sleep study and discuss management.  He had PSG on PAP therapy followed by MSLT.    10/22/2024: Diagnostic PSG on PAP therapy: Well-managed on current pressure of CPAP 14 cm H2O.  There were no significant periodic limb movements.  Patient fragmented sleep appear to be due to atypical EEG waveforms and stereotypical chewing  10/23/2024: Multiple sleep latency test: Patient had a mean sleep latency of 16.1 with 0 sleep onset REM periods.  Sleep logs were not available for review.  Urine drug screen was positive for marijuana, which the patient has previously disclosed    His youngest daughter had absence seizures when she was little. No other knowledge of absence seizures. His daughter has cerebral palsy and born very premature  ESS: 15     A downloaded compliance report was reviewed and was interpreted by myself as follows:  > 4 hour compliance was 90%, with an average use of 7 hours and 37 minutes, with a residual AHI 2.3 on AutoPap 5 to 15 cm H2O.  95th percentile pressure of 11 cm H2O.  He wears a traditional fullface mask occasionally disconnects from the hose and leaks.  He said it is infrequent but he does continue to find some benefit from PAP therapy although continues to demonstrate persistent hypersomnolence.  He is not having any daytime seizure activity.     Tim Greenwood \"Bashir\" reports good benefit from his device.   Review of Systems  Review of systems negative except as per HPI  Objective   There were no vitals taken for this visit.   PREVIOUS WEIGHTS:  Wt Readings from Last 3 Encounters:   10/22/24 87 kg (191 lb 12.8 oz)   05/09/24 87.1 kg (192 lb)   02/08/24 83.9 kg (185 lb)     Physical Exam  PHYSICAL EXAM: GENERAL: alert pleasant and cooperative no acute distress  PSYCH EXAM: alert,oriented, in NAD with a full range of affect, normal behavior and no psychotic features    Lab " "Results   Component Value Date    IRON 88 01/06/2023    TIBC 277 01/06/2023    FERRITIN 119 01/31/2018        Assessment/Plan   Problem List Items Addressed This Visit             ICD-10-CM    Obstructive sleep apnea (adult) (pediatric) G47.33     Diagnostic PSG on PAP therapy indicated that CPAP was effective at 14 cm H2O         Hypersomnolence disorder G47.10     Review of clinical studies indicate that modafinil is safe in patients with seizure disorder.  He had good clinical benefit with the modafinil.  We did discuss this in the visit.  He did have a positive urine drug screen for marijuana.  Patient reports that he has stopped using marijuana and would like to continue with modafinil.  We did discuss that that would be fine provided that he has a negative urine drug screen which we will order and request that he complete this in the next 72 hours.  Urine drug screen is ordered.  Recommend follow-up in 1 month to review the results of the EEG.  Tim \"Bashir\"  verbalized understanding         Relevant Orders    EEG    History of multiple concussions Z87.820    Abnormal EEG - Primary R94.01     He had abnormal EEG the cause arousals during sleep.  He had stereotypical chewing movements that may be seizure activity.  We did discuss that it could also be artifact due to bruxism.  Patient has history of multiple concussions has daytime sleepiness with a negative MSLT for narcolepsy, idiopathic hypersomnolence or hypersomnia due to obstructive sleep apnea.  This would fit a clinical history of nocturnal seizures.  Recommend we start with a daytime EEG.  If negative then we would move forward with a 72-hour ambulatory EEG and refer to neurology for further evaluation.         Relevant Orders    EEG          "

## 2024-11-12 NOTE — ASSESSMENT & PLAN NOTE
He had abnormal EEG the cause arousals during sleep.  He had stereotypical chewing movements that may be seizure activity.  We did discuss that it could also be artifact due to bruxism.  Patient has history of multiple concussions has daytime sleepiness with a negative MSLT for narcolepsy, idiopathic hypersomnolence or hypersomnia due to obstructive sleep apnea.  This would fit a clinical history of nocturnal seizures.  Recommend we start with a daytime EEG.  If negative then we would move forward with a 72-hour ambulatory EEG and refer to neurology for further evaluation.

## 2024-11-12 NOTE — ASSESSMENT & PLAN NOTE
Diagnostic PSG on PAP therapy indicated that CPAP was effective at 14 cm H2O. continue current settings

## 2024-11-15 ENCOUNTER — LAB (OUTPATIENT)
Dept: LAB | Facility: LAB | Age: 39
End: 2024-11-15
Payer: COMMERCIAL

## 2024-11-15 DIAGNOSIS — Z79.899 MEDICATION MANAGEMENT: ICD-10-CM

## 2024-11-15 PROCEDURE — 80365 DRUG SCREENING OXYCODONE: CPT

## 2024-11-15 PROCEDURE — 80307 DRUG TEST PRSMV CHEM ANLYZR: CPT

## 2024-11-15 PROCEDURE — 80368 SEDATIVE HYPNOTICS: CPT

## 2024-11-15 PROCEDURE — 82570 ASSAY OF URINE CREATININE: CPT

## 2024-11-15 PROCEDURE — 80373 DRUG SCREENING TRAMADOL: CPT

## 2024-11-15 PROCEDURE — 80361 OPIATES 1 OR MORE: CPT

## 2024-11-15 PROCEDURE — 80354 DRUG SCREENING FENTANYL: CPT

## 2024-11-15 PROCEDURE — 80358 DRUG SCREENING METHADONE: CPT

## 2024-11-15 PROCEDURE — 80346 BENZODIAZEPINES1-12: CPT

## 2024-11-16 LAB
AMPHETAMINES UR QL SCN: NORMAL
BARBITURATES UR QL SCN: NORMAL
BZE UR QL SCN: NORMAL
CANNABINOIDS UR QL SCN: NORMAL
CREAT UR-MCNC: 32 MG/DL (ref 20–370)
PCP UR QL SCN: NORMAL

## 2024-11-18 DIAGNOSIS — G47.10 HYPERSOMNOLENCE DISORDER: ICD-10-CM

## 2024-11-18 RX ORDER — MODAFINIL 100 MG/1
100 TABLET ORAL DAILY
Qty: 30 TABLET | Refills: 2 | Status: SHIPPED | OUTPATIENT
Start: 2024-11-18 | End: 2025-02-16

## 2024-12-30 ENCOUNTER — APPOINTMENT (OUTPATIENT)
Dept: PRIMARY CARE | Facility: CLINIC | Age: 39
End: 2024-12-30
Payer: COMMERCIAL

## 2024-12-31 ENCOUNTER — APPOINTMENT (OUTPATIENT)
Dept: PRIMARY CARE | Facility: CLINIC | Age: 39
End: 2024-12-31
Payer: COMMERCIAL

## 2025-01-09 ENCOUNTER — APPOINTMENT (OUTPATIENT)
Dept: PRIMARY CARE | Facility: CLINIC | Age: 40
End: 2025-01-09
Payer: COMMERCIAL

## 2025-01-09 VITALS
WEIGHT: 185 LBS | TEMPERATURE: 98 F | OXYGEN SATURATION: 98 % | BODY MASS INDEX: 26.48 KG/M2 | DIASTOLIC BLOOD PRESSURE: 74 MMHG | HEART RATE: 93 BPM | SYSTOLIC BLOOD PRESSURE: 118 MMHG

## 2025-01-09 DIAGNOSIS — Z13.220 LIPID SCREENING: ICD-10-CM

## 2025-01-09 DIAGNOSIS — N52.9 ERECTILE DYSFUNCTION, UNSPECIFIED ERECTILE DYSFUNCTION TYPE: ICD-10-CM

## 2025-01-09 DIAGNOSIS — R25.2 CRAMP AND SPASM: Primary | ICD-10-CM

## 2025-01-09 PROCEDURE — 99214 OFFICE O/P EST MOD 30 MIN: CPT | Performed by: STUDENT IN AN ORGANIZED HEALTH CARE EDUCATION/TRAINING PROGRAM

## 2025-01-09 PROCEDURE — 93000 ELECTROCARDIOGRAM COMPLETE: CPT | Performed by: STUDENT IN AN ORGANIZED HEALTH CARE EDUCATION/TRAINING PROGRAM

## 2025-01-09 PROCEDURE — 1036F TOBACCO NON-USER: CPT | Performed by: STUDENT IN AN ORGANIZED HEALTH CARE EDUCATION/TRAINING PROGRAM

## 2025-01-09 ASSESSMENT — ENCOUNTER SYMPTOMS
FEVER: 0
LIGHT-HEADEDNESS: 0
RHINORRHEA: 0
CHILLS: 0
SLEEP DISTURBANCE: 0
POLYDIPSIA: 1
MYALGIAS: 1
FREQUENCY: 0
DYSPHORIC MOOD: 0
COUGH: 0
SHORTNESS OF BREATH: 1
DIZZINESS: 0
PALPITATIONS: 0
SINUS PAIN: 0
FATIGUE: 0
DIARRHEA: 0
DYSURIA: 0
HEADACHES: 0
VOMITING: 0
WHEEZING: 0
NAUSEA: 0
SINUS PRESSURE: 0
CONSTIPATION: 0
NERVOUS/ANXIOUS: 0
WOUND: 0
ARTHRALGIAS: 0

## 2025-01-09 ASSESSMENT — PAIN SCALES - GENERAL: PAINLEVEL_OUTOF10: 0-NO PAIN

## 2025-01-09 NOTE — PROGRESS NOTES
"Subjective   Patient ID: Tim Greenwood \"Bashir\" is a 39 y.o. male who presents for Spasms (Muscle spasms/cramps in legs, back, chest, side of abdomen x 4 months/Worse in legs, very painful, pain is getting worse, pt is a  and he stated that it happens after working on one part of the body).    Here today with chief complaint of spasms and tightness in bilateral legs.  Has been getting bad cramps everywhere after working out, but worst in legs.  Cramps are taking an unusually long time to go away.  Has also noticed new onset erectile dysfunction around the same time period.  Has been noticing increased sweating, some increased dyspnea.     No family history of thyroid disease, diabetes.       Review of Systems   Constitutional:  Negative for chills, fatigue and fever.   HENT:  Negative for congestion, hearing loss, rhinorrhea, sinus pressure, sinus pain and tinnitus.    Eyes:  Negative for visual disturbance.   Respiratory:  Positive for shortness of breath. Negative for cough and wheezing.    Cardiovascular:  Negative for chest pain, palpitations and leg swelling.   Gastrointestinal:  Negative for constipation, diarrhea, nausea and vomiting.   Endocrine: Positive for heat intolerance, polydipsia and polyuria. Negative for cold intolerance.   Genitourinary:  Negative for dysuria, frequency and urgency.   Musculoskeletal:  Positive for myalgias. Negative for arthralgias.   Skin:  Negative for pallor, rash and wound.   Neurological:  Negative for dizziness, light-headedness and headaches.   Psychiatric/Behavioral:  Negative for dysphoric mood and sleep disturbance. The patient is not nervous/anxious.        Objective     Visit Vitals  /74   Pulse 93   Temp 36.7 °C (98 °F)   Wt 83.9 kg (185 lb)   SpO2 98%   BMI 26.48 kg/m²   Smoking Status Former   BSA 2.04 m²         Physical Exam  Constitutional:       Appearance: Normal appearance.   HENT:      Head: Normocephalic and atraumatic.      " Right Ear: Tympanic membrane, ear canal and external ear normal.      Left Ear: Tympanic membrane, ear canal and external ear normal.      Nose: Nose normal.      Mouth/Throat:      Mouth: Mucous membranes are moist.      Pharynx: Oropharynx is clear.   Eyes:      Extraocular Movements: Extraocular movements intact.      Conjunctiva/sclera: Conjunctivae normal.      Pupils: Pupils are equal, round, and reactive to light.   Cardiovascular:      Rate and Rhythm: Normal rate and regular rhythm.      Pulses: Normal pulses.      Heart sounds: Normal heart sounds.   Pulmonary:      Effort: Pulmonary effort is normal.      Breath sounds: Normal breath sounds.   Abdominal:      General: There is no distension.      Palpations: Abdomen is soft.      Tenderness: There is no abdominal tenderness.   Musculoskeletal:         General: Normal range of motion.   Skin:     General: Skin is warm and dry.   Neurological:      Mental Status: He is alert and oriented to person, place, and time. Mental status is at baseline.   Psychiatric:         Mood and Affect: Mood normal.         Behavior: Behavior normal.         Assessment & Plan  Cramp and spasm    Orders:    Comprehensive metabolic panel; Future    CBC; Future    Magnesium; Future    Sedimentation Rate; Future    C-reactive protein; Future    Tsh With Reflex To Free T4 If Abnormal; Future    Hemoglobin A1c; Future    ECG 12 lead (Clinic Performed)  Differential could include electrolyte disturbance, PAD.  Given patient also having polyuria, polydipsia it would be worth considering diabetes as a possibility even though his lifestyle would point away from this.  EKG was essentially normal.   Lipid screening    Orders:    Comprehensive metabolic panel; Future    CBC; Future    Lipid panel; Future    Erectile dysfunction, unspecified erectile dysfunction type    Orders:    Tsh With Reflex To Free T4 If Abnormal; Future    Hemoglobin A1c; Future    ECG 12 lead (Clinic Performed)  In  an otherwise healthy person, this might point to an underlying vascular issue.  Might consider MEY if current workup proves unrevealing.      Reviewed and approved by KIMBER STRINGER on 1/9/25 at 8:22 PM.

## 2025-01-13 ENCOUNTER — LAB (OUTPATIENT)
Dept: LAB | Facility: LAB | Age: 40
End: 2025-01-13
Payer: COMMERCIAL

## 2025-01-13 DIAGNOSIS — Z13.220 LIPID SCREENING: ICD-10-CM

## 2025-01-13 DIAGNOSIS — N52.9 ERECTILE DYSFUNCTION, UNSPECIFIED ERECTILE DYSFUNCTION TYPE: ICD-10-CM

## 2025-01-13 DIAGNOSIS — R25.2 CRAMP AND SPASM: ICD-10-CM

## 2025-01-13 LAB
ALBUMIN SERPL BCP-MCNC: 4.3 G/DL (ref 3.4–5)
ALP SERPL-CCNC: 41 U/L (ref 33–120)
ALT SERPL W P-5'-P-CCNC: 50 U/L (ref 10–52)
ANION GAP SERPL CALCULATED.3IONS-SCNC: 10 MMOL/L (ref 10–20)
AST SERPL W P-5'-P-CCNC: 30 U/L (ref 9–39)
BILIRUB SERPL-MCNC: 1 MG/DL (ref 0–1.2)
BUN SERPL-MCNC: 18 MG/DL (ref 6–23)
CALCIUM SERPL-MCNC: 9.1 MG/DL (ref 8.6–10.3)
CHLORIDE SERPL-SCNC: 103 MMOL/L (ref 98–107)
CHOLEST SERPL-MCNC: 158 MG/DL (ref 0–199)
CHOLEST/HDLC SERPL: 2.2 {RATIO}
CO2 SERPL-SCNC: 30 MMOL/L (ref 21–32)
CREAT SERPL-MCNC: 0.99 MG/DL (ref 0.5–1.3)
CRP SERPL-MCNC: <0.1 MG/DL
EGFRCR SERPLBLD CKD-EPI 2021: >90 ML/MIN/1.73M*2
ERYTHROCYTE [DISTWIDTH] IN BLOOD BY AUTOMATED COUNT: 14.6 % (ref 11.5–14.5)
ERYTHROCYTE [SEDIMENTATION RATE] IN BLOOD BY WESTERGREN METHOD: <1 MM/H (ref 0–15)
EST. AVERAGE GLUCOSE BLD GHB EST-MCNC: 88 MG/DL
GLUCOSE SERPL-MCNC: 88 MG/DL (ref 74–99)
HBA1C MFR BLD: 4.7 %
HCT VFR BLD AUTO: 45.9 % (ref 41–52)
HDLC SERPL-MCNC: 73.2 MG/DL
HGB BLD-MCNC: 15.6 G/DL (ref 13.5–17.5)
LDLC SERPL CALC-MCNC: 72 MG/DL
MAGNESIUM SERPL-MCNC: 2.28 MG/DL (ref 1.6–2.4)
MCH RBC QN AUTO: 31.5 PG (ref 26–34)
MCHC RBC AUTO-ENTMCNC: 34 G/DL (ref 32–36)
MCV RBC AUTO: 93 FL (ref 80–100)
NON HDL CHOLESTEROL: 85 MG/DL (ref 0–149)
NRBC BLD-RTO: 0 /100 WBCS (ref 0–0)
PLATELET # BLD AUTO: 230 X10*3/UL (ref 150–450)
POTASSIUM SERPL-SCNC: 4.6 MMOL/L (ref 3.5–5.3)
PROT SERPL-MCNC: 6.4 G/DL (ref 6.4–8.2)
RBC # BLD AUTO: 4.95 X10*6/UL (ref 4.5–5.9)
SODIUM SERPL-SCNC: 138 MMOL/L (ref 136–145)
TRIGL SERPL-MCNC: 63 MG/DL (ref 0–149)
TSH SERPL-ACNC: 1.91 MIU/L (ref 0.44–3.98)
VLDL: 13 MG/DL (ref 0–40)
WBC # BLD AUTO: 3.8 X10*3/UL (ref 4.4–11.3)

## 2025-01-13 PROCEDURE — 86140 C-REACTIVE PROTEIN: CPT

## 2025-01-13 PROCEDURE — 80053 COMPREHEN METABOLIC PANEL: CPT

## 2025-01-13 PROCEDURE — 84443 ASSAY THYROID STIM HORMONE: CPT

## 2025-01-13 PROCEDURE — 83735 ASSAY OF MAGNESIUM: CPT

## 2025-01-13 PROCEDURE — 83036 HEMOGLOBIN GLYCOSYLATED A1C: CPT

## 2025-01-13 PROCEDURE — 85652 RBC SED RATE AUTOMATED: CPT

## 2025-01-13 PROCEDURE — 85027 COMPLETE CBC AUTOMATED: CPT

## 2025-01-13 PROCEDURE — 80061 LIPID PANEL: CPT

## 2025-01-17 ENCOUNTER — PATIENT MESSAGE (OUTPATIENT)
Dept: PRIMARY CARE | Facility: CLINIC | Age: 40
End: 2025-01-17
Payer: COMMERCIAL

## 2025-01-17 DIAGNOSIS — N52.9 ERECTILE DYSFUNCTION, UNSPECIFIED ERECTILE DYSFUNCTION TYPE: Primary | ICD-10-CM

## 2025-01-20 RX ORDER — TADALAFIL 5 MG/1
5 TABLET ORAL DAILY PRN
Qty: 12 TABLET | Refills: 3 | Status: SHIPPED | OUTPATIENT
Start: 2025-01-20 | End: 2025-02-19

## 2025-01-21 ENCOUNTER — HOSPITAL ENCOUNTER (OUTPATIENT)
Dept: NEUROLOGY | Facility: HOSPITAL | Age: 40
Discharge: HOME | End: 2025-01-21
Payer: COMMERCIAL

## 2025-01-21 DIAGNOSIS — R94.01 ABNORMAL EEG: ICD-10-CM

## 2025-01-21 DIAGNOSIS — G47.10 HYPERSOMNOLENCE DISORDER: ICD-10-CM

## 2025-01-21 PROCEDURE — 95819 EEG AWAKE AND ASLEEP: CPT

## 2025-01-21 PROCEDURE — 95819 EEG AWAKE AND ASLEEP: CPT | Performed by: PSYCHIATRY & NEUROLOGY

## 2025-01-23 ENCOUNTER — TELEPHONE (OUTPATIENT)
Dept: SLEEP MEDICINE | Facility: HOSPITAL | Age: 40
End: 2025-01-23
Payer: COMMERCIAL

## 2025-01-23 ENCOUNTER — PATIENT MESSAGE (OUTPATIENT)
Dept: SLEEP MEDICINE | Facility: HOSPITAL | Age: 40
End: 2025-01-23
Payer: COMMERCIAL

## 2025-01-23 DIAGNOSIS — G47.10 HYPERSOMNOLENCE DISORDER: ICD-10-CM

## 2025-01-23 NOTE — TELEPHONE ENCOUNTER
Pt returned call and left VM explaining that [employer] messaging will be okay for results. Sent message.

## 2025-01-23 NOTE — TELEPHONE ENCOUNTER
----- Message from Irwin Castillo sent at 1/22/2025  3:04 PM EST -----  His EEG is negative. We can prescribe modafinil, but he needs to have a negative UDS to prescribe controlled substances as discussed. If he tests positive then he will need to see a different provider if he would like to pursue medication for this  ----- Message -----  From: Interface, Onbase - Scan In  Sent: 1/21/2025   1:41 PM EST  To: Irwin Castillo MD

## 2025-01-23 NOTE — TELEPHONE ENCOUNTER
Called patient and left VM regarding the availability of result.  Sleep nurse phone number [phone] - given to call back for results and plan going forward.

## 2025-01-27 RX ORDER — MODAFINIL 100 MG/1
100 TABLET ORAL DAILY
Qty: 30 TABLET | Refills: 2 | Status: SHIPPED | OUTPATIENT
Start: 2025-01-27 | End: 2025-04-27

## 2025-01-31 ENCOUNTER — TELEMEDICINE (OUTPATIENT)
Dept: UROLOGY | Facility: CLINIC | Age: 40
End: 2025-01-31
Payer: COMMERCIAL

## 2025-01-31 DIAGNOSIS — Z98.52 AZOOSPERMIA AFTER VASECTOMY: Primary | ICD-10-CM

## 2025-01-31 DIAGNOSIS — Z72.89 ENGAGES IN VAPING: ICD-10-CM

## 2025-01-31 PROCEDURE — 99214 OFFICE O/P EST MOD 30 MIN: CPT | Performed by: UROLOGY

## 2025-01-31 PROCEDURE — 99406 BEHAV CHNG SMOKING 3-10 MIN: CPT | Performed by: UROLOGY

## 2025-01-31 NOTE — PROGRESS NOTES
"Virtual or Telephone Consent    An interactive audio and video telecommunication system which permits real time communications between the patient (at the originating site) and provider (at the distant site) was utilized to provide this telehealth service.   Verbal consent was requested and obtained from Tim Greenwood on this date, 01/31/25 for a telehealth visit.   Subjective   Tim Greenwood \"Bashir\" is a 39 y.o. male who is for a vasectomy reversal consult     Vasectomy was in: 2017-  Previous children 2 kids- ages 6,7.  Youngest has CP.  Postoperative complications: none      Currently : yes  Partners name: Magy  Partner age: 35  Partner's previous pregnancies: no  Previous testosterone or anabolic use:    Sleep medicine X 3 and PCP notes reviewed.     He saw Tom FORBES-CNP    Shx: Knee and hand surgery    Past Surgical History:   Procedure Laterality Date    OTHER SURGICAL HISTORY  01/05/2022    Vasectomy     LABS:    Lab Results   Component Value Date    TESTOSTERONE 765 01/10/2024       CBC        Component  Ref Range & Units 2 wk ago  (1/13/25)   WBC  4.4 - 11.3 x10*3/uL 3.8 Low    nRBC  0.0 - 0.0 /100 WBCs 0.0   RBC  4.50 - 5.90 x10*6/uL 4.95   Hemoglobin  13.5 - 17.5 g/dL 15.6   Hematocrit  41.0 - 52.0 % 45.9   MCV  80 - 100 fL 93   MCH  26.0 - 34.0 pg 31.5   MCHC  32.0 - 36.0 g/dL 34.0   RDW  11.5 - 14.5 % 14.6 High    Platelets  150 - 450 x10*3/uL 230        Comprehensive metabolic panel        Component  Ref Range & Units 2 wk ago  (1/13/25)   Glucose  74 - 99 mg/dL 88   Sodium  136 - 145 mmol/L 138   Potassium  3.5 - 5.3 mmol/L 4.6   Chloride  98 - 107 mmol/L 103   Bicarbonate  21 - 32 mmol/L 30   Anion Gap  10 - 20 mmol/L 10   Urea Nitrogen  6 - 23 mg/dL 18   Creatinine  0.50 - 1.30 mg/dL 0.99   eGFR  >60 mL/min/1.73m*2 >90   Calcium  8.6 - 10.3 mg/dL 9.1   Albumin  3.4 - 5.0 g/dL 4.3   Alkaline Phosphatase  33 - 120 U/L 41   Total Protein  6.4 - 8.2 g/dL 6.4 "   AST  9 - 39 U/L 30   Bilirubin, Total  0.0 - 1.2 mg/dL 1.0   ALT  10 - 52 U/L 50        Lipid panel           Component  Ref Range & Units 2 wk ago 11 mo ago 2 yr ago   Cholesterol  0 - 199 mg/dL 158 208 High  R 149 R   HDL-Cholesterol  mg/dL 73.2 71.0 R, CM 58 R, CM   Cholesterol/HDL Ratio 2.2 2.9 R, CM 2.6 R, CM   LDL Calculated  <=99 mg/dL 72 127 R 79 R   VLDL  0 - 40 mg/dL 13     Triglycerides  0 - 149 mg/dL 63 49 R 60 R   Non HDL Cholesterol  0 - 149 mg/dL 85             Your medication list            Accurate as of January 31, 2025 10:49 AM. If you have any questions, ask your nurse or doctor.                CONTINUE taking these medications        Instructions Last Dose Given Next Dose Due   modafinil 100 mg tablet  Commonly known as: Provigil      Take 1 tablet (100 mg) by mouth once daily.       multivitamin tablet           tadalafil 5 mg tablet  Commonly known as: Cialis      Take 1 tablet (5 mg) by mouth once daily as needed for erectile dysfunction.                No Known Allergies     Exam  CONSTITUTIONAL:        No acute distress    HEAD:        Normocephalic and atraumatic    CHEST / RESPIRATORY      no excess work of breathing, no respiratory distress,    ABDOMEN / GASTROINTESTINAL:        Abdomen nondistended      Assessment   #Azoospermia vs Vasectomy.   The patient was counseled regarding his options regarding fertility after vasectomy. Specifically we talked about retrieving sperm for assisted reproductive technologies vs doing a vasectomy reversal. We discussed both vaso-vasostomy and vaso-epidymostomy, their success rates and complications, and the chances of needing either procedure depending on sperm findings. Also discussed CHANDRA vs TESE for sperm extraction, including risks, benefits, or alternatives.     -will contact us to proceed with Vasectomy Reversal +/- TESE  -Package Pricing reviewed.     #Smoking Cessation   -I spent over 3 minutes of the visit counseling the patient in  regards to cessation of vaping.     Scribe Attestation  By signing my name below, I, Elizabeth Wallace attest that this documentation has been prepared under the direction and in the presence of Deidre Riojas MD. All medical record entries made by the Scribe were at my direction or personally dictated by me. I have reviewed the chart and agree that the record accurately reflects my personal performance of the history, physical exam, discussion and plan.

## 2025-02-17 ENCOUNTER — PREP FOR PROCEDURE (OUTPATIENT)
Dept: UROLOGY | Facility: HOSPITAL | Age: 40
End: 2025-02-17
Payer: COMMERCIAL

## 2025-02-17 ENCOUNTER — HOSPITAL ENCOUNTER (OUTPATIENT)
Facility: HOSPITAL | Age: 40
Setting detail: OUTPATIENT SURGERY
End: 2025-02-17
Attending: UROLOGY | Admitting: UROLOGY
Payer: COMMERCIAL

## 2025-02-17 DIAGNOSIS — Z98.52 AZOOSPERMIA AFTER VASECTOMY: Primary | ICD-10-CM

## 2025-02-17 RX ORDER — CEFAZOLIN SODIUM 2 G/100ML
2 INJECTION, SOLUTION INTRAVENOUS ONCE
OUTPATIENT
Start: 2025-02-17 | End: 2025-02-17

## 2025-03-03 ENCOUNTER — APPOINTMENT (OUTPATIENT)
Dept: PRIMARY CARE | Facility: CLINIC | Age: 40
End: 2025-03-03
Payer: COMMERCIAL

## 2025-03-12 ENCOUNTER — APPOINTMENT (OUTPATIENT)
Dept: PRIMARY CARE | Facility: CLINIC | Age: 40
End: 2025-03-12
Payer: COMMERCIAL

## 2025-03-18 ENCOUNTER — APPOINTMENT (OUTPATIENT)
Dept: PRIMARY CARE | Facility: CLINIC | Age: 40
End: 2025-03-18
Payer: COMMERCIAL

## 2025-03-18 PROBLEM — G56.00 CARPAL TUNNEL SYNDROME: Status: ACTIVE | Noted: 2023-01-05

## 2025-03-18 PROBLEM — M23.91 INTERNAL DERANGEMENT OF RIGHT KNEE: Status: ACTIVE | Noted: 2022-02-18

## 2025-03-18 PROBLEM — J30.2 SEASONAL ALLERGIES: Status: ACTIVE | Noted: 2022-06-04

## 2025-03-18 PROBLEM — R06.83 SNORING: Status: ACTIVE | Noted: 2023-01-25

## 2025-03-18 PROBLEM — J06.9 ACUTE URI: Status: ACTIVE | Noted: 2023-05-31

## 2025-03-18 PROBLEM — M25.561 RIGHT KNEE PAIN: Status: ACTIVE | Noted: 2022-01-24

## 2025-03-18 PROBLEM — Z86.2 HISTORY OF ANEMIA: Status: ACTIVE | Noted: 2023-01-05

## 2025-03-18 PROBLEM — K42.9 UMBILICAL HERNIA: Status: ACTIVE | Noted: 2025-03-18

## 2025-03-18 PROBLEM — R68.82 DECREASED LIBIDO: Status: ACTIVE | Noted: 2023-01-05

## 2025-03-18 PROBLEM — M79.643 PAIN OF HAND: Status: ACTIVE | Noted: 2022-07-12

## 2025-03-18 PROBLEM — S83.241A TEAR OF MEDIAL MENISCUS OF RIGHT KNEE, CURRENT: Status: ACTIVE | Noted: 2025-03-18

## 2025-03-18 PROBLEM — M79.641 BILATERAL HAND PAIN: Status: ACTIVE | Noted: 2022-07-12

## 2025-03-18 PROBLEM — M25.469 KNEE SWELLING: Status: ACTIVE | Noted: 2022-01-24

## 2025-03-18 PROBLEM — M79.642 BILATERAL HAND PAIN: Status: ACTIVE | Noted: 2022-07-12

## 2025-03-18 PROBLEM — R53.83 FATIGUE: Status: ACTIVE | Noted: 2023-01-05

## 2025-03-19 ENCOUNTER — HOSPITAL ENCOUNTER (OUTPATIENT)
Dept: RADIOLOGY | Facility: CLINIC | Age: 40
Discharge: HOME | End: 2025-03-19
Payer: COMMERCIAL

## 2025-03-19 ENCOUNTER — OFFICE VISIT (OUTPATIENT)
Dept: PRIMARY CARE | Facility: CLINIC | Age: 40
End: 2025-03-19
Payer: COMMERCIAL

## 2025-03-19 VITALS
SYSTOLIC BLOOD PRESSURE: 102 MMHG | OXYGEN SATURATION: 98 % | DIASTOLIC BLOOD PRESSURE: 70 MMHG | WEIGHT: 192 LBS | HEART RATE: 84 BPM | BODY MASS INDEX: 27.49 KG/M2 | HEIGHT: 70 IN

## 2025-03-19 DIAGNOSIS — G89.29 CHRONIC PAIN OF BOTH KNEES: ICD-10-CM

## 2025-03-19 DIAGNOSIS — D72.818 OTHER DECREASED WHITE BLOOD CELL (WBC) COUNT: ICD-10-CM

## 2025-03-19 DIAGNOSIS — M25.561 CHRONIC PAIN OF BOTH KNEES: ICD-10-CM

## 2025-03-19 DIAGNOSIS — Z13.29 SCREENING FOR THYROID DISORDER: ICD-10-CM

## 2025-03-19 DIAGNOSIS — G47.33 OSA (OBSTRUCTIVE SLEEP APNEA): ICD-10-CM

## 2025-03-19 DIAGNOSIS — M25.562 CHRONIC PAIN OF BOTH KNEES: ICD-10-CM

## 2025-03-19 DIAGNOSIS — Z11.59 SCREENING FOR VIRAL DISEASE: ICD-10-CM

## 2025-03-19 DIAGNOSIS — D72.819 LEUKOPENIA, UNSPECIFIED TYPE: ICD-10-CM

## 2025-03-19 DIAGNOSIS — R79.9 ABNORMAL FINDING OF BLOOD CHEMISTRY, UNSPECIFIED: ICD-10-CM

## 2025-03-19 DIAGNOSIS — Z13.1 SCREENING FOR DIABETES MELLITUS: ICD-10-CM

## 2025-03-19 DIAGNOSIS — Z12.5 SCREENING FOR MALIGNANT NEOPLASM OF PROSTATE: ICD-10-CM

## 2025-03-19 DIAGNOSIS — Z00.00 WELLNESS EXAMINATION: Primary | ICD-10-CM

## 2025-03-19 DIAGNOSIS — R25.2 MUSCLE CRAMPS: ICD-10-CM

## 2025-03-19 DIAGNOSIS — Z13.6 ENCOUNTER FOR SCREENING FOR CARDIOVASCULAR DISORDERS: ICD-10-CM

## 2025-03-19 PROCEDURE — 99396 PREV VISIT EST AGE 40-64: CPT | Performed by: FAMILY MEDICINE

## 2025-03-19 PROCEDURE — 73564 X-RAY EXAM KNEE 4 OR MORE: CPT | Mod: LEFT SIDE | Performed by: RADIOLOGY

## 2025-03-19 PROCEDURE — 99214 OFFICE O/P EST MOD 30 MIN: CPT | Performed by: FAMILY MEDICINE

## 2025-03-19 PROCEDURE — 99386 PREV VISIT NEW AGE 40-64: CPT | Performed by: FAMILY MEDICINE

## 2025-03-19 PROCEDURE — 73564 X-RAY EXAM KNEE 4 OR MORE: CPT | Mod: LT

## 2025-03-19 PROCEDURE — 3008F BODY MASS INDEX DOCD: CPT | Performed by: FAMILY MEDICINE

## 2025-03-19 PROCEDURE — 99214 OFFICE O/P EST MOD 30 MIN: CPT | Mod: 25 | Performed by: FAMILY MEDICINE

## 2025-03-19 PROCEDURE — 1036F TOBACCO NON-USER: CPT | Performed by: FAMILY MEDICINE

## 2025-03-19 RX ORDER — CYCLOBENZAPRINE HCL 10 MG
10 TABLET ORAL NIGHTLY PRN
Qty: 30 TABLET | Refills: 0 | Status: SHIPPED | OUTPATIENT
Start: 2025-03-19 | End: 2025-05-18

## 2025-03-19 RX ORDER — NAPROXEN 500 MG/1
500 TABLET ORAL 2 TIMES DAILY PRN
Qty: 60 TABLET | Refills: 0 | Status: SHIPPED | OUTPATIENT
Start: 2025-03-19 | End: 2025-06-17

## 2025-03-19 ASSESSMENT — ENCOUNTER SYMPTOMS
OCCASIONAL FEELINGS OF UNSTEADINESS: 0
LOSS OF SENSATION IN FEET: 0
DEPRESSION: 0

## 2025-03-19 ASSESSMENT — PATIENT HEALTH QUESTIONNAIRE - PHQ9
SUM OF ALL RESPONSES TO PHQ9 QUESTIONS 1 AND 2: 0
2. FEELING DOWN, DEPRESSED OR HOPELESS: NOT AT ALL
1. LITTLE INTEREST OR PLEASURE IN DOING THINGS: NOT AT ALL

## 2025-03-19 ASSESSMENT — COLUMBIA-SUICIDE SEVERITY RATING SCALE - C-SSRS
1. IN THE PAST MONTH, HAVE YOU WISHED YOU WERE DEAD OR WISHED YOU COULD GO TO SLEEP AND NOT WAKE UP?: NO
2. HAVE YOU ACTUALLY HAD ANY THOUGHTS OF KILLING YOURSELF?: NO
6. HAVE YOU EVER DONE ANYTHING, STARTED TO DO ANYTHING, OR PREPARED TO DO ANYTHING TO END YOUR LIFE?: NO

## 2025-03-19 ASSESSMENT — PAIN SCALES - GENERAL: PAINLEVEL_OUTOF10: 4

## 2025-03-19 NOTE — PROGRESS NOTES
39-year presents to establish care for yearly physical new complaints    Health Maintenance:  Eats a varied and healthy diet.  Exercises regularly.  Does not drink, smoke, use illicit substances.  Otherwise up-to-date on all routine health maintenance screenings.  Due for immunizations.  Due for yearly lab work.    Muscle spasms:  Patient's been experiencing muscle spasms generalized in different parts of his body throughout the recent history.  Previous physician did order some routine lab work and electrolyte levels and magnesium levels were normal, inflammatory markers were not elevated.    Sleep apnea:  Patient currently treated by sleep medicine utilize modafinil, CPAP however does not notice any clinical benefit with CPAP    Leukopenia:  Hematology workup around 7 years ago did not show any obvious pathologic causes presumed to be likely inherited leukopenia however was requested to repeat follow-up and so he is due for a follow-up with hematology most recent CBC did show persistent leukopenia    Knee pain:  Status post meniscectomy bilaterally has been having some increasing pain in his left knee without prior trauma or injury      All pertinent positive symptoms are included in history of present illness.    All other systems have been reviewed and are negative and noncontributory to this patient's current ailments.      CONSTITUTIONAL - INAD. Not ill appearing.  SKIN - No lesions or rashes visualized. Good skin turgor.  HEENT- Head is atraumatic and normocephalic. Nasal turbinates are nonerythematous and without drainage. .  RESP - CTAB. No wheezing, rhonchi, or crackles.   CARDIAC - RRR. No murmurs, gallops, or rubs.  ABDOMEN - Soft, nontender, nondistended. No organomegaly.  NEURO - CNs 2-12 grossly intact.  PSYCH - Normal mood and affect  MUSCULOSKELETAL-mild numbness to palpation over the patellar tendon in the left knee    1. AUGUST (obstructive sleep apnea)  Continue follow-up with sleep medicine.  Could  consider inspire device if CPAP is ineffective    2. Wellness examination (Primary)  Health and wellness topics discussed today.  Recommended eating a varied and healthy diet and made dietary recommendations, also discussed exercise and exercising regularly 30 minutes a day 3 days a week.  Immunizations recommended and updated.  Health screening guidelines discussed with patient including possible things such as colonoscopies, mammograms, prostate screenings, lung cancer screenings, bone densitometry, and other wellness topics.  Yearly lab work reviewed  today.      10. Leukopenia, unspecified type  Workup by hematology  - Referral To Hematology and Oncology; Future    11. Chronic pain of both knees  We had a long discussion in regards to your knee pain.  We discussed knee arthritis as well as meniscus tears today.      We will start off by treating your knee conservatively (AKA non-operatively).  We gave you a prescription for physical therapy to work on increasing the range of motion and strength in the knee.     We offered you an injection of steroids into your knee.  I explained to you the risks and benefits of a steroid injection into your knee.      X-rays of the knee were ordered/reviewed today.    We will see you back in 4-6 weeks to reevaluate your knee pain. If you are still having pain at that time, we would then need to order an MRI to look for possible meniscus tears and assess for cartilage damage in the knee, as well as provide a referral to orthopedic surgery. We will see you back in 4-6 weeks, or sooner if necessary. Please call with any questions or problems.    - Referral to Physical Therapy; Future  - naproxen (Naprosyn) 500 mg tablet; Take 1 tablet (500 mg) by mouth 2 times a day as needed for mild pain (1 - 3) (pain).  Dispense: 60 tablet; Refill: 0  - XR knee left 4+ views; Future    12. Muscle cramps  Trial tonic water with quinine.  Can attempt Flexeril as needed for muscle spasms.   Incidence of multiple muscle spasm/pain.  Please reach out and let me know and I will order electrolyte levels as well as a CK level.    - cyclobenzaprine (Flexeril) 10 mg tablet; Take 1 tablet (10 mg) by mouth as needed at bedtime for muscle spasms.  Dispense: 30 tablet; Refill: 0

## 2025-03-21 NOTE — RESULT ENCOUNTER NOTE
X-ray of the knee reveals very mild degenerative changes in the knee including medial compartment narrowing and bone spurring.  Continue conservative management follow-up with orthopedic surgery if not improving

## 2025-04-01 ENCOUNTER — APPOINTMENT (OUTPATIENT)
Dept: HEMATOLOGY/ONCOLOGY | Facility: CLINIC | Age: 40
End: 2025-04-01
Payer: COMMERCIAL

## 2025-04-14 ENCOUNTER — APPOINTMENT (OUTPATIENT)
Dept: RADIOLOGY | Facility: HOSPITAL | Age: 40
End: 2025-04-14
Payer: COMMERCIAL

## 2025-04-16 ENCOUNTER — APPOINTMENT (OUTPATIENT)
Dept: RADIOLOGY | Facility: HOSPITAL | Age: 40
End: 2025-04-16
Payer: COMMERCIAL

## 2025-04-21 ENCOUNTER — LAB (OUTPATIENT)
Dept: LAB | Facility: CLINIC | Age: 40
End: 2025-04-21
Payer: COMMERCIAL

## 2025-04-21 ENCOUNTER — HOSPITAL ENCOUNTER (OUTPATIENT)
Dept: RADIOLOGY | Facility: HOSPITAL | Age: 40
Discharge: HOME | End: 2025-04-21
Payer: COMMERCIAL

## 2025-04-21 DIAGNOSIS — D72.819 LEUKOPENIA, UNSPECIFIED TYPE: ICD-10-CM

## 2025-04-21 LAB
ALBUMIN SERPL BCP-MCNC: 4.5 G/DL (ref 3.4–5)
ALP SERPL-CCNC: 64 U/L (ref 33–120)
ALT SERPL W P-5'-P-CCNC: 45 U/L (ref 10–52)
ANION GAP SERPL CALC-SCNC: 14 MMOL/L (ref 10–20)
AST SERPL W P-5'-P-CCNC: 32 U/L (ref 9–39)
BASOPHILS # BLD AUTO: 0.04 X10*3/UL (ref 0–0.1)
BASOPHILS NFR BLD AUTO: 0.7 %
BILIRUB SERPL-MCNC: 0.6 MG/DL (ref 0–1.2)
BUN SERPL-MCNC: 24 MG/DL (ref 6–23)
CALCIUM SERPL-MCNC: 9.2 MG/DL (ref 8.6–10.3)
CHLORIDE SERPL-SCNC: 102 MMOL/L (ref 98–107)
CO2 SERPL-SCNC: 24 MMOL/L (ref 21–32)
CREAT SERPL-MCNC: 0.87 MG/DL (ref 0.5–1.3)
EGFRCR SERPLBLD CKD-EPI 2021: >90 ML/MIN/1.73M*2
EOSINOPHIL # BLD AUTO: 0.09 X10*3/UL (ref 0–0.7)
EOSINOPHIL NFR BLD AUTO: 1.5 %
ERYTHROCYTE [DISTWIDTH] IN BLOOD BY AUTOMATED COUNT: 13.4 % (ref 11.5–14.5)
FERRITIN SERPL-MCNC: 124 NG/ML (ref 20–300)
FOLATE SERPL-MCNC: 20.2 NG/ML
GLUCOSE SERPL-MCNC: 106 MG/DL (ref 74–99)
HCT VFR BLD AUTO: 43.7 % (ref 41–52)
HGB BLD-MCNC: 15.4 G/DL (ref 13.5–17.5)
IMM GRANULOCYTES # BLD AUTO: 0.01 X10*3/UL (ref 0–0.7)
IMM GRANULOCYTES NFR BLD AUTO: 0.2 % (ref 0–0.9)
IRON SATN MFR SERPL: 15 % (ref 25–45)
IRON SERPL-MCNC: 54 UG/DL (ref 35–150)
LYMPHOCYTES # BLD AUTO: 1.38 X10*3/UL (ref 1.2–4.8)
LYMPHOCYTES NFR BLD AUTO: 23.2 %
MCH RBC QN AUTO: 31.4 PG (ref 26–34)
MCHC RBC AUTO-ENTMCNC: 35.2 G/DL (ref 32–36)
MCV RBC AUTO: 89 FL (ref 80–100)
MONOCYTES # BLD AUTO: 0.69 X10*3/UL (ref 0.1–1)
MONOCYTES NFR BLD AUTO: 11.6 %
NEUTROPHILS # BLD AUTO: 3.75 X10*3/UL (ref 1.2–7.7)
NEUTROPHILS NFR BLD AUTO: 62.8 %
NRBC BLD-RTO: 0 /100 WBCS (ref 0–0)
PLATELET # BLD AUTO: 213 X10*3/UL (ref 150–450)
POTASSIUM SERPL-SCNC: 3.8 MMOL/L (ref 3.5–5.3)
PROT SERPL-MCNC: 6.8 G/DL (ref 6.4–8.2)
RBC # BLD AUTO: 4.9 X10*6/UL (ref 4.5–5.9)
SODIUM SERPL-SCNC: 136 MMOL/L (ref 136–145)
TIBC SERPL-MCNC: 350 UG/DL (ref 240–445)
UIBC SERPL-MCNC: 296 UG/DL (ref 110–370)
VIT B12 SERPL-MCNC: 697 PG/ML (ref 211–911)
WBC # BLD AUTO: 6 X10*3/UL (ref 4.4–11.3)

## 2025-04-21 PROCEDURE — 83550 IRON BINDING TEST: CPT

## 2025-04-21 PROCEDURE — 36415 COLL VENOUS BLD VENIPUNCTURE: CPT

## 2025-04-21 PROCEDURE — 82728 ASSAY OF FERRITIN: CPT

## 2025-04-21 PROCEDURE — 81450 HL NEO GSAP 5-50DNA/DNA&RNA: CPT

## 2025-04-21 PROCEDURE — 82746 ASSAY OF FOLIC ACID SERUM: CPT

## 2025-04-21 PROCEDURE — 80053 COMPREHEN METABOLIC PANEL: CPT

## 2025-04-21 PROCEDURE — 85025 COMPLETE CBC W/AUTO DIFF WBC: CPT

## 2025-04-21 PROCEDURE — 76700 US EXAM ABDOM COMPLETE: CPT | Performed by: RADIOLOGY

## 2025-04-21 PROCEDURE — 76700 US EXAM ABDOM COMPLETE: CPT

## 2025-04-21 PROCEDURE — 82607 VITAMIN B-12: CPT

## 2025-04-24 ENCOUNTER — OFFICE VISIT (OUTPATIENT)
Dept: HEMATOLOGY/ONCOLOGY | Facility: CLINIC | Age: 40
End: 2025-04-24
Payer: COMMERCIAL

## 2025-04-24 VITALS
WEIGHT: 188.16 LBS | RESPIRATION RATE: 18 BRPM | SYSTOLIC BLOOD PRESSURE: 125 MMHG | OXYGEN SATURATION: 97 % | HEIGHT: 69 IN | BODY MASS INDEX: 27.87 KG/M2 | DIASTOLIC BLOOD PRESSURE: 70 MMHG | TEMPERATURE: 98.1 F | HEART RATE: 89 BPM

## 2025-04-24 DIAGNOSIS — D72.819 LEUKOPENIA, UNSPECIFIED TYPE: Primary | ICD-10-CM

## 2025-04-24 PROCEDURE — 3008F BODY MASS INDEX DOCD: CPT | Performed by: INTERNAL MEDICINE

## 2025-04-24 PROCEDURE — 99205 OFFICE O/P NEW HI 60 MIN: CPT | Performed by: INTERNAL MEDICINE

## 2025-04-24 PROCEDURE — 99215 OFFICE O/P EST HI 40 MIN: CPT | Performed by: INTERNAL MEDICINE

## 2025-04-24 ASSESSMENT — PAIN SCALES - GENERAL: PAINLEVEL_OUTOF10: 0-NO PAIN

## 2025-04-24 NOTE — PROGRESS NOTES
Pt seen in office today for anew patient visit with Dr. Pat Garza for management of leukopenia.He has been referred to our office by Dr. Sheppard.  He is without complaints today and denies pain, though states he feels tired.    Medications, pharmacy preference and allergies were reviewed with patient and updated in the medical record by MD.     Per orders, labs and a CT a/p were completed on 4/21/25 and results are to be reviewed in office today by MD with patient. He is to RTC in 3 months with labs prior to his visit.    Our contact information was given to patient and they were encouraged to contact us with any questions or concerns.    Patient verbalized understanding and agreement regarding discussed information via verbal feedback. Pt escorted to scheduling.

## 2025-04-24 NOTE — PROGRESS NOTES
"Patient ID: Bashir Greenwood is a 39 y.o. male.  Referring Physician: Papa Sheppard DO  [id number] [place] [address]  Primary Care Provider: Papa Sheppard DO  Referral Reason: leukopenia    Subjective:  fatigue    Heme/Onc History:  04/24/25: initial hematology visit        Past Medical History: Medical History[1]  Social History:   Social History     Socioeconomic History    Marital status: Single     Spouse name: Not on file    Number of children: Not on file    Years of education: Not on file    Highest education level: Not on file   Occupational History    Not on file   Tobacco Use    Smoking status: Former     Types: Cigarettes    Smokeless tobacco: Never   Vaping Use    Vaping status: Never Used   Substance and Sexual Activity    Alcohol use: Never    Drug use: Never    Sexual activity: Not on file   Other Topics Concern    Not on file   Social History Narrative    Not on file     Social Drivers of Health     Financial Resource Strain: Not on file   Food Insecurity: Not on file   Transportation Needs: Not on file   Physical Activity: Not on file   Stress: Not on file   Social Connections: Not on file   Intimate Partner Violence: Not on file   Housing Stability: Not on file     Surgical History: Surgical History[2]  Family History: Family History[3]   reports that he has quit smoking. His smoking use included cigarettes. He has never used smokeless tobacco.  Oncology Family history: Cancer-related family history includes Cancer in his father.    Review Of Systems:  As stated per in HPI; otherwise all other 12 point ROS are negative    Physical Exam:  /70 (BP Location: Left arm, Patient Position: Sitting, BP Cuff Size: Adult long)   Pulse 89   Temp 36.7 °C (98.1 °F) (Temporal)   Resp 18   Ht (S) 1.75 m (5' 8.9\")   Wt 85.3 kg (188 lb 2.6 oz)   SpO2 97%   BMI 27.87 kg/m²   BSA: 2.04 meters squared  General: awake/alert/oriented x3, no distress, alert and cooperative  Head: Short hair fully " covering scalp. Symmetric facial expressions  Eyes: PERRL, EOMI, clear sclera, eyebrows present.  Ears/Nose/Mouth/Throat:  Oral mucous membranes moist. No oral ulcers. No palpable pre/post-auricular lymph nodes  Neck: No palpable cervical chain lymph nodes  Respiratory: unlabored breathing on room air, good chest expansion, thorax symmetric  Cardio: Regular rate and rhythm, normal S1 and S2, radial pulses symmetric  GI: Nondistended, soft, non-tender abdomen  Musculoskeletal: Normal muscle bulk and tone, ROM intact, no joint swelling.  Rises from chair and walks unassisted.  Extremities: No ankle swelling, no arm or leg wounds  Neuro: Alert, cognition intact, speech normal. Facial expressions symmetric.  No motor deficits noted. Sensation intact to touch and hot/cold.   Able to stand from seated position unassisted and walks around the room unassisted.  Psychological: Appropriate mood and behavior.  Skin: Warm and dry, no lesions, no rashes    Results:  Diagnostic Results   Lab Results   Component Value Date    WBC 6.0 04/21/2025    HGB 15.4 04/21/2025    HCT 43.7 04/21/2025    MCV 89 04/21/2025     04/21/2025     Lab Results   Component Value Date    CALCIUM 9.2 04/21/2025     04/21/2025    K 3.8 04/21/2025    CO2 24 04/21/2025     04/21/2025    BUN 24 (H) 04/21/2025    CREATININE 0.87 04/21/2025    ALT 45 04/21/2025    AST 32 04/21/2025     Current Medications[4]     Assessment/Plan:  ? Leukopenia:    Sicne 2018. Wbc is normal now. He used to drink heavily until 2021. It could be due to BM suppression due to ETOH. Abd US does not show HSM    NGS is pending    He has iron def due to low iron st. Oral iron every day for 3 months is recommended    RTC in 3 months with labs    Diagnoses and all orders for this visit:  Leukopenia, unspecified type  -     Iron and TIBC; Future  -     Vitamin B12; Future  -     Ferritin; Future  -     Comprehensive Metabolic Panel; Future  -     Folate; Future  -      CBC and Auto Differential; Future  -     Myeloid Malignancies NGS Panel; Future  -     US abdomen complete; Future  -     Referral To Hematology and Oncology  -     Clinic Appointment Request; Future  -     CBC and Auto Differential; Future  -     Comprehensive metabolic panel; Future  -     Iron and TIBC; Future  -     Vitamin B12; Future  -     Ferritin; Future  -     TSH; Future  -     Vitamin D 25-Hydroxy,Total (for eval of Vitamin D levels); Future       Performance Status: Symptomatic; fully ambulatory    I spent more than 60 minutes for the patient today, including face-to-face conversation, pre-visit preparation, post-visit orders, and others.   Pat Garza MD                              [1] No past medical history on file.  [2]   Past Surgical History:  Procedure Laterality Date    OTHER SURGICAL HISTORY  01/05/2022    Vasectomy   [3]   Family History  Problem Relation Name Age of Onset    Cancer Father     [4]   Current Outpatient Medications:     cyclobenzaprine (Flexeril) 10 mg tablet, Take 1 tablet (10 mg) by mouth as needed at bedtime for muscle spasms., Disp: 30 tablet, Rfl: 0    modafinil (Provigil) 100 mg tablet, Take 1 tablet (100 mg) by mouth once daily., Disp: 30 tablet, Rfl: 2    multivitamin tablet, Take 1 tablet by mouth once daily., Disp: , Rfl:     naproxen (Naprosyn) 500 mg tablet, Take 1 tablet (500 mg) by mouth 2 times a day as needed for mild pain (1 - 3) (pain)., Disp: 60 tablet, Rfl: 0

## 2025-04-25 LAB
ELECTRONICALLY SIGNED BY: NORMAL
MYELOID NGS RESULTS: NORMAL

## 2025-04-28 ENCOUNTER — CLINICAL SUPPORT (OUTPATIENT)
Dept: PREADMISSION TESTING | Facility: HOSPITAL | Age: 40
End: 2025-04-28
Payer: COMMERCIAL

## 2025-04-28 DIAGNOSIS — Z98.52 AZOOSPERMIA AFTER VASECTOMY: ICD-10-CM

## 2025-04-28 RX ORDER — FERROUS SULFATE 325(65) MG
325 TABLET ORAL
COMMUNITY

## 2025-05-01 ENCOUNTER — PRE-ADMISSION TESTING (OUTPATIENT)
Dept: PREADMISSION TESTING | Facility: HOSPITAL | Age: 40
End: 2025-05-01
Payer: COMMERCIAL

## 2025-05-12 ENCOUNTER — PRE-ADMISSION TESTING (OUTPATIENT)
Dept: PREADMISSION TESTING | Facility: HOSPITAL | Age: 40
End: 2025-05-12
Payer: COMMERCIAL

## 2025-05-12 NOTE — CPM/PAT NURSE NOTE
"CPM/PAT Nurse Note      Name: Tim Greenwood (Tim Greenwood \"Bashir\")  /Age: 1985/39 y.o.       Medical History[1]    Surgical History[2]    Patient Sexual activity questions deferred to the physician.    Family History[3]    Allergies[4]    Prior to Admission medications    Medication Sig Start Date End Date Taking? Authorizing Provider   cyclobenzaprine (Flexeril) 10 mg tablet Take 1 tablet (10 mg) by mouth as needed at bedtime for muscle spasms. 3/19/25 5/18/25  Papa Sheppard DO   ferrous sulfate 325 mg (65 mg elemental) tablet Take 1 tablet (325 mg) by mouth once daily with breakfast.    Historical Provider, MD   modafinil (Provigil) 100 mg tablet Take 1 tablet (100 mg) by mouth once daily. 25  Irwin Castillo MD   multivitamin tablet Take 1 tablet by mouth once daily.    Historical Provider, MD   naproxen (Naprosyn) 500 mg tablet Take 1 tablet (500 mg) by mouth 2 times a day as needed for mild pain (1 - 3) (pain). 3/19/25 6/17/25  DO NELY Martinez ROS     DASI Risk Score      Flowsheet Row Questionnaire Series Submission from 2025 in [facility] with Generic Provider Saqib   Can you take care of yourself (eat, dress, bathe, or use toilet)?  2.75  filed at 2025   Can you walk indoors, such as around your house? 1.75  filed at 2025   Can you walk a block or two on level ground?  2.75  filed at 2025   Can you climb a flight of stairs or walk up a hill? 5.5  filed at 2025   Can you run a short distance? 8  filed at 2025   Can you do light work around the house like dusting or washing dishes? 2.7  filed at 2025   Can you do moderate work around the house like vacuuming, sweeping floors or carrying groceries? 3.5  filed at 2025   Can you do heavy work around the house like scrubbing floors or lifting and moving heavy furniture?  8  filed at 2025   Can you do yard work like " raking leaves, weeding or pushing a mower? 4.5  filed at 02/17/2025 2056   Can you have sexual relations? 5.25  filed at 02/17/2025 2056   Can you participate in moderate recreational activities like golf, bowling, dancing, doubles tennis or throwing a baseball or football? 6  filed at 02/17/2025 2056   Can you participate in strenous sports like swimming, singles tennis, football, basketball, or skiing? 7.5  filed at 02/17/2025 2056   DASI SCORE 58.2  filed at 02/17/2025 2056   METS Score (Will be calculated only when all the questions are answered) 9.9  filed at 02/17/2025 2056          Caprini DVT Assessment    No data to display       Modified Frailty Index    No data to display       [id number] Stroke Risk Points  Current as of just now        N/A 0 to 9 Points:      Last Change: N/A          The KNR5HK2-QHRb risk score (Lip MODESTO, et al. 2009. © 2010 [employer]) quantifies the risk of stroke for a patient with atrial fibrillation. For patients without atrial fibrillation or under the age of 18 this score appears as N/A. Higher score values generally indicate higher risk of stroke.        This score is not applicable to this patient. Components are not calculated.          Revised Cardiac Risk Index    No data to display       Apfel Simplified Score    No data to display       Risk Analysis Index Results This Encounter    No data found in the last 10 encounters.       Prodigy: High Risk  Total Score: 8              Prodigy Gender Score          ARISCAT Score for Postoperative Pulmonary Complications    No data to display       Gracia Perioperative Risk for Myocardial Infarction or Cardiac Arrest (CARISSA)    No data to display         Nurse Plan of Action: RN screening call complete.  Reviewed allergies, medications and pharmacy, medical, surgical and social history with patient.  Chart updated.                 [1]   Past Medical History:  Diagnosis Date    Anxiety     Azoospermia after  vasectomy     Plan: Vasectomy Reversal; Sperm Extraction 5/15/25    Leukopenia     AUGUST on CPAP     Overweight    [2]   Past Surgical History:  Procedure Laterality Date    CARPAL TUNNEL RELEASE  2024    TOENAIL EXCISION  2022    VASECTOMY  2017    WISDOM TOOTH EXTRACTION  2009   [3]   Family History  Problem Relation Name Age of Onset    Cancer Mother Jeannette Timo     Alcohol abuse Mother Jeannette Linolesli     Cancer Father Alfredo Greenwood     Alcohol abuse Brother Tyson Greenwood    [4] No Known Allergies

## 2025-05-22 ENCOUNTER — OFFICE VISIT (OUTPATIENT)
Dept: PRIMARY CARE | Facility: CLINIC | Age: 40
End: 2025-05-22
Payer: COMMERCIAL

## 2025-05-22 VITALS
WEIGHT: 192 LBS | BODY MASS INDEX: 28.44 KG/M2 | SYSTOLIC BLOOD PRESSURE: 124 MMHG | HEART RATE: 78 BPM | DIASTOLIC BLOOD PRESSURE: 74 MMHG | OXYGEN SATURATION: 98 % | HEIGHT: 69 IN

## 2025-05-22 DIAGNOSIS — G47.33 OSA (OBSTRUCTIVE SLEEP APNEA): ICD-10-CM

## 2025-05-22 DIAGNOSIS — M35.3 POLYMYALGIA (MULTI): Primary | ICD-10-CM

## 2025-05-22 DIAGNOSIS — R53.83 OTHER FATIGUE: ICD-10-CM

## 2025-05-22 DIAGNOSIS — R25.2 MUSCLE CRAMPS: ICD-10-CM

## 2025-05-22 PROCEDURE — 3008F BODY MASS INDEX DOCD: CPT | Performed by: FAMILY MEDICINE

## 2025-05-22 PROCEDURE — 99214 OFFICE O/P EST MOD 30 MIN: CPT | Performed by: FAMILY MEDICINE

## 2025-05-22 RX ORDER — TIZANIDINE HYDROCHLORIDE 4 MG/1
4 CAPSULE, GELATIN COATED ORAL 3 TIMES DAILY
Qty: 90 CAPSULE | Refills: 0 | Status: SHIPPED | OUTPATIENT
Start: 2025-05-22 | End: 2025-06-21

## 2025-05-22 ASSESSMENT — PAIN SCALES - GENERAL: PAINLEVEL_OUTOF10: 0-NO PAIN

## 2025-05-22 NOTE — PROGRESS NOTES
39-year presents to clinic for follow-up    Muscle cramp/spasms:  Patient continues to experience muscle cramps and spasms.  Cyclobenzaprine at nighttime did seem to help but during the day he continues to experience muscle cramps and spasms in different areas of his body primarily his thighs, right upper quadrant of his abdomen especially associated with physical activity.  Has not done formal physical therapy yet.  Lab work thus far has been unrevealing all electrolyte levels have been within normal limits.  Hematology workup was unrevealing other than low percent saturation for which she was given oral iron supplementation which has not seemed to help his cramping so far.  Endorses chronic fatigue.    Has AUGUST cannot tolerate CPAP    All pertinent positive symptoms are included in history of present illness.    All other systems have been reviewed and are negative and noncontributory to this patient's current ailments.      CONSTITUTIONAL - INAD. Not ill appearing.  SKIN - No lesions or rashes visualized. No jaundice visualized.  HEENT- Atraumatic, normocephalic, no scleral icterus, external nares are not erythematous and without drainage, no neck masses visualized, oropharynx visualized and is without erythema or exudate  RESP - respiration not labored   CARDIAC - no grade 6 systolic murmurs auscultated  ABDOMEN - nondistended.  NEURO- CNs II-XII grossly intact        1. Polymyalgia (Multi) (Primary)  Unsure etiology all workup thus far has been unrevealing CT abdomen was unrevealing at Emergency Department visit.  Will get arthritis panel to workup for any autoimmune or rheumatologic condition, trial Zanaflex, refer to urology rheumatology and pain medicine  - Rheumatoid Factor; Future  - C-Reactive Protein; Future  - Sedimentation Rate; Future  - Uric Acid; Future  - SONAL with Reflex to KAREN; Future  - Citrulline Antibody, IgG; Future  - CK; Future  - Referral to Pain Medicine; Future  - tiZANidine (Zanaflex) 4  mg capsule; Take 1 capsule (4 mg) by mouth 3 times a day.  Dispense: 90 capsule; Refill: 0  - Rheumatoid Factor  - C-Reactive Protein  - Sedimentation Rate  - Uric Acid  - SONAL with Reflex to KAREN  - Citrulline Antibody, IgG  - CK  - Referral to Neurology; Future  - Referral to Rheumatology; Future    2. Muscle cramps    - Rheumatoid Factor; Future  - C-Reactive Protein; Future  - Sedimentation Rate; Future  - Uric Acid; Future  - SONAL with Reflex to KAREN; Future  - Citrulline Antibody, IgG; Future  - CK; Future  - Referral to Pain Medicine; Future  - tiZANidine (Zanaflex) 4 mg capsule; Take 1 capsule (4 mg) by mouth 3 times a day.  Dispense: 90 capsule; Refill: 0  - Rheumatoid Factor  - C-Reactive Protein  - Sedimentation Rate  - Uric Acid  - SONAL with Reflex to KAREN  - Citrulline Antibody, IgG  - CK  - Referral to Neurology; Future  - Referral to Rheumatology; Future    3. Other fatigue    - Testosterone; Future  - Testosterone  - Referral to Neurology; Future  - Referral to Rheumatology; Future    4. AUGUST (obstructive sleep apnea)    - Referral to Adult Sleep Medicine; Future

## 2025-05-24 LAB
ANA SER QL IF: NORMAL
CCP IGG SERPL-ACNC: NORMAL
CK SERPL-CCNC: 227 U/L (ref 26–366)
CRP SERPL-MCNC: NORMAL MG/L
ERYTHROCYTE [SEDIMENTATION RATE] IN BLOOD BY WESTERGREN METHOD: 2 MM/H
RHEUMATOID FACT SERPL-ACNC: NORMAL [IU]/ML
TESTOST SERPL-MCNC: 952 NG/DL (ref 250–827)
URATE SERPL-MCNC: 6 MG/DL (ref 4–8)

## 2025-05-28 ENCOUNTER — APPOINTMENT (OUTPATIENT)
Dept: UROLOGY | Facility: CLINIC | Age: 40
End: 2025-05-28
Payer: COMMERCIAL

## 2025-05-28 LAB
ANA SER QL IF: NEGATIVE
CCP IGG SERPL-ACNC: <16 UNITS
CK SERPL-CCNC: 227 U/L (ref 26–366)
CRP SERPL-MCNC: <3 MG/L
ERYTHROCYTE [SEDIMENTATION RATE] IN BLOOD BY WESTERGREN METHOD: 2 MM/H
RHEUMATOID FACT SERPL-ACNC: <10 IU/ML
TESTOST SERPL-MCNC: 952 NG/DL (ref 250–827)
URATE SERPL-MCNC: 6 MG/DL (ref 4–8)

## 2025-06-17 ENCOUNTER — APPOINTMENT (OUTPATIENT)
Dept: HEMATOLOGY/ONCOLOGY | Facility: CLINIC | Age: 40
End: 2025-06-17
Payer: COMMERCIAL

## 2025-06-17 ENCOUNTER — APPOINTMENT (OUTPATIENT)
Dept: PAIN MEDICINE | Facility: CLINIC | Age: 40
End: 2025-06-17
Payer: COMMERCIAL

## 2025-06-19 ENCOUNTER — APPOINTMENT (OUTPATIENT)
Dept: PRIMARY CARE | Facility: CLINIC | Age: 40
End: 2025-06-19
Payer: COMMERCIAL

## 2025-06-23 ENCOUNTER — OFFICE VISIT (OUTPATIENT)
Dept: NEUROLOGY | Facility: HOSPITAL | Age: 40
End: 2025-06-23
Payer: COMMERCIAL

## 2025-06-23 ENCOUNTER — LAB (OUTPATIENT)
Dept: LAB | Facility: HOSPITAL | Age: 40
End: 2025-06-23
Payer: COMMERCIAL

## 2025-06-23 ENCOUNTER — APPOINTMENT (OUTPATIENT)
Dept: LAB | Facility: HOSPITAL | Age: 40
End: 2025-06-23
Payer: COMMERCIAL

## 2025-06-23 VITALS
HEIGHT: 68 IN | RESPIRATION RATE: 18 BRPM | BODY MASS INDEX: 28.95 KG/M2 | WEIGHT: 191 LBS | SYSTOLIC BLOOD PRESSURE: 121 MMHG | DIASTOLIC BLOOD PRESSURE: 70 MMHG | HEART RATE: 84 BPM | TEMPERATURE: 97.1 F

## 2025-06-23 DIAGNOSIS — M35.3 POLYMYALGIA (MULTI): ICD-10-CM

## 2025-06-23 DIAGNOSIS — R10.9 UNSPECIFIED ABDOMINAL PAIN: Primary | ICD-10-CM

## 2025-06-23 DIAGNOSIS — R10.9 ABDOMINAL SPASMS: Primary | ICD-10-CM

## 2025-06-23 DIAGNOSIS — R25.2 MUSCLE CRAMPS: ICD-10-CM

## 2025-06-23 DIAGNOSIS — R53.83 OTHER FATIGUE: ICD-10-CM

## 2025-06-23 PROCEDURE — 99417 PROLNG OP E/M EACH 15 MIN: CPT | Performed by: PSYCHIATRY & NEUROLOGY

## 2025-06-23 PROCEDURE — 99215 OFFICE O/P EST HI 40 MIN: CPT | Performed by: PSYCHIATRY & NEUROLOGY

## 2025-06-23 PROCEDURE — 3008F BODY MASS INDEX DOCD: CPT | Performed by: PSYCHIATRY & NEUROLOGY

## 2025-06-23 PROCEDURE — 36415 COLL VENOUS BLD VENIPUNCTURE: CPT

## 2025-06-23 PROCEDURE — 86341 ISLET CELL ANTIBODY: CPT

## 2025-06-23 PROCEDURE — 1036F TOBACCO NON-USER: CPT | Performed by: PSYCHIATRY & NEUROLOGY

## 2025-06-23 PROCEDURE — 86255 FLUORESCENT ANTIBODY SCREEN: CPT

## 2025-06-23 ASSESSMENT — PAIN SCALES - GENERAL: PAINLEVEL_OUTOF10: 0-NO PAIN

## 2025-06-23 NOTE — PATIENT INSTRUCTIONS
You were evaluated for nine months of painful spasms and muscle twitching in your entire body. You exam shows some increased tone in the legs and rare fasciculations (muscle twitching).     I will order an EMG test to evaluate your muscles first and I will also order a number of blood tests for conditions that can cause muscle spasms and twitching.     If the initial testing is negative then we may need to do an MRI of your spinal cord and possibly the brain as well.     Follow up in 4 to 5 weeks to discuss test results.     Thank you for visiting the clinic today    Rod Kan MD

## 2025-06-23 NOTE — PROGRESS NOTES
Subjective     Tim Greenwood is a right handed  39 y.o. male, a  and avid weight , with hx of obesity (used to be over 360 pounds and lost > 100 pounds over a period of 2 to 3 years back in 2010), AUGUST, prior smoking quit in 2016 smoked for 12 years before that, hx of heavy alcohol use now cutting down, anxiety, stuttering since childhood, and prediabetes who presents with spasms.   Visit type: provider referral: PCP     Symptoms started in September of 2024 with painful spasms in the chest, abdomen, shoulders, and quads. The spasms could be in the form of bad lissette horses or can cause abnormal extension of the legs or arms, or cause the trunk to lean to one side. He can't tell for sure if the abnormal posturing involuntary or something he does to guard against the pain. The spasms are triggered by exertion mostly although they can be spontaneous sometimes. In between attacks, he has been feeling more tight than usually especially in the legs requiring frequent deep massage sessions at the gym. He's also been experiencing frequent visible muscle twitching throughout the body especially the quads. No weakness, slight numbness related to known CTS that he can shake off. No exaggerated startle or stimulus hypersensitivity. Reports some urinary frequency related to high fluid intake. No loss of muscle mass. PCP checked electrolytes and were normal. CK, ESR, CRP, SONAL, RA normal. He subsequently referred him to neurology and rheumatology.     Problem List[1]   Medical History[2]   Surgical History[3]   Social History     Socioeconomic History    Marital status: Single     Spouse name: Not on file    Number of children: Not on file    Years of education: Not on file    Highest education level: Not on file   Occupational History    Not on file   Tobacco Use    Smoking status: Former     Current packs/day: 0.00     Average packs/day: 1 pack/day for 15.0 years (15.0 ttl pk-yrs)     Types:  "Cigarettes     Start date:      Quit date: 2017     Years since quittin.4    Smokeless tobacco: Never   Vaping Use    Vaping status: Some Days    Substances: Nicotine, Flavoring   Substance and Sexual Activity    Alcohol use: Not Currently     Alcohol/week: 0.0 - 1.0 standard drinks of alcohol    Drug use: Yes     Types: Marijuana     Comment: 3 times/week    Sexual activity: Defer   Other Topics Concern    Not on file   Social History Narrative    Not on file     Social Drivers of Health     Financial Resource Strain: Not on file   Food Insecurity: Not on file   Transportation Needs: Not on file   Physical Activity: Not on file   Stress: Not on file   Social Connections: Not on file   Intimate Partner Violence: Not on file   Housing Stability: Not on file      Family History[4]              Review of Systems  All other system have been reviewed and are negative for complaint.    Vitals:    25 1540   BP: 121/70   Pulse: 84   Resp: 18   Temp: 36.2 °C (97.1 °F)   Weight: 86.6 kg (191 lb)   Height: 1.727 m (5' 8\")     Objective   Neurological Exam  GENERAL APPEARANCE:  No distress, alert and cooperative.     MENTAL STATE:  Orientation was normal to time, place and person. Recent and remote memory was intact.  Attention span and concentration were normal. Language testing was normal for comprehension, repetition, expression, and naming. Stuttering.     CRANIAL NERVES:  Cranial nerves were normal.      CN 2- Visual Acuity  OD: 20/30 (corrected)   OS: 20/25 (corrected); visual fields full to confrontation.      CN 3, 4, 6-  Pupils round, 4 mm in diameter, equally reactive to light. No ptosis. EOMs normal alignment, full range of movement, no nystagmus. Head impulse test negative. No skew deviation.      CN 5- Facial sensation intact bilaterally. Normal corneal reflexes.      CN 7- Normal and symmetric facial strength. Nasolabial folds symmetric.     CN 8- Hearing intact to finger rub, whisper.      CN 9- " Palate elevates symmetrically. Normal gag reflex.      CN 11- Normal strength of shoulder shrug and neck turning      CN 12- Tongue midline, with normal bulk and strength; no fasciculations.     MOTOR:  Muscle bulk was isabel. Muscle tone was increased in BLE vs failure of relaxation. Muscle strength was 5/5 in distal and proximal muscles in both upper and lower extremities. Rare inducible fasciculations seen in the quads and the left triceps by tapping, no spasms, tremors or other abnormal movements were present.     REFLEXES:  Right/ Left:  Biceps ++, brachioradialis ++ triceps +, patellar ++, ankle   ++ No clonus, frontal release signs or other pathologic reflexes present.     SENSORY: Sensory exam negative for hyposthesia    COORDINATION: MARILY were intact in upper and lower extremities.  In UE- finger-nose-finger was intact and in LE-     GAIT: Station was stable with a normal base and negative Romberg sign. Gait was stable with a normal arm swing and speed. No ataxia, shuffling, steppage or waddling was noted. Tandem gait was intact.               Hemoglobin A1C   Date Value Ref Range Status   01/13/2025 4.7 See comment % Final     Estimated Average Glucose   Date Value Ref Range Status   01/13/2025 88 Not Established mg/dL Final     Thyroid Stimulating Hormone   Date Value Ref Range Status   01/13/2025 1.91 0.44 - 3.98 mIU/L Final     Folate, Serum   Date Value Ref Range Status   04/21/2025 20.2 >5.0 ng/mL Final           Assessment/Plan       Tim Greenwood is a 39 y.o.  male, a  and avid weight , with hx of obesity (used to be over 360 pounds and lost > 100 pounds over a period of 2 to 3 years back in 2010), AUGUST, prior smoking quit in 2016 smoked for 12 years before that, hx of heavy alcohol use now cutting down, anxiety, stuttering since childhood, and prediabetes who presents with nine months of painful isometric and extensor spasms of the legs, arms, and trunk triggered by  exertion along with frequent muscle twitching without weakness or loss of muscle mass. No significant sensory or bladder symptoms. Exam positive for BLE hypertonia and some inducible fasciculations in the bilateral quads and left triceps without muscle wasting or weakness. Normal electrolytes, CK, ESR, CRP, and basic rheumatology workup. Unclear etiology but we need to rule out MND, SPS, and a spinal cord process including nutritional causes.      PLAN:  You were evaluated for nine months of painful spasms and muscle twitching in your entire body. Your exam shows some increased tone in the legs and rare fasciculations (muscle twitching).     I will order an EMG test to evaluate your muscles first and I will also order a number of blood tests for conditions that can cause muscle spasms and twitching.     If the initial testing is negative then we may need to do an MRI of your spinal cord and possibly the brain as well.     Follow up in 4 to 5 weeks to discuss test results.     The impression and plan were discussed with the patient and family (if present) in details and all questions answered.        Will share the note with the referring physician via EMR    Rod Kan MD                  [1]   Patient Active Problem List  Diagnosis    Obstructive sleep apnea (adult) (pediatric)    Excessive daytime sleepiness    Hypersomnolence disorder    History of multiple concussions    Abnormal EEG    Azoospermia after vasectomy    Acute URI    Bilateral hand pain    Carpal tunnel syndrome    Decreased libido    Fatigue    History of anemia    Internal derangement of right knee    Knee swelling    Right knee pain    Seasonal allergies    Pain of hand    Snoring    Tear of medial meniscus of right knee, current    Umbilical hernia   [2]   Past Medical History:  Diagnosis Date    Anxiety     Azoospermia after vasectomy     Plan: Vasectomy Reversal; Sperm Extraction 5/15/25    Leukopenia     AUGUST on CPAP     Overweight     [3]   Past Surgical History:  Procedure Laterality Date    CARPAL TUNNEL RELEASE  2024    TOENAIL EXCISION  2022    VASECTOMY  2017    WISDOM TOOTH EXTRACTION  2009   [4]   Family History  Problem Relation Name Age of Onset    Cancer Mother Jeannette Greenwood     Alcohol abuse Mother Jeannette Greenwood     Cancer Father Alfredo Greenwood     Alcohol abuse Brother Tyson Timo

## 2025-06-28 LAB
A-TOCOPHEROL VIT E SERPL-MCNC: 17 MG/L (ref 5.7–19.9)
BETA+GAMMA TOCOPHEROL SERPL-MCNC: <1 MG/L
COPPER BLD-MCNC: NORMAL UG/DL
METHYLMALONATE SERPL-SCNC: 174 NMOL/L (ref 55–335)
PYRIDOXAL PHOS SERPL-MCNC: 117.1 NG/ML (ref 2.1–21.7)
VIT B1 BLD-SCNC: NORMAL NMOL/L

## 2025-06-30 LAB
A-TOCOPHEROL VIT E SERPL-MCNC: 17 MG/L (ref 5.7–19.9)
BETA+GAMMA TOCOPHEROL SERPL-MCNC: <1 MG/L
COPPER BLD-MCNC: 77 MCG/DL
METHYLMALONATE SERPL-SCNC: 174 NMOL/L (ref 55–335)
PYRIDOXAL PHOS SERPL-MCNC: 117.1 NG/ML (ref 2.1–21.7)
VIT B1 BLD-SCNC: NORMAL NMOL/L

## 2025-07-02 LAB
AMPAR2 IGG SERPL QL CBA IFA: NEGATIVE
AMPHIPHYSIN IGG SER QL IA: NEGATIVE
ANNOTATION COMMENT IMP: NORMAL
CASPR2 IGG SER QL CBA IFA: NEGATIVE
CV2 AB SERPL QL IF: NEGATIVE
DPPX IGG SER QL CBA IFA: NEGATIVE
GABABR IGG SERPL QL CBA IFA: NEGATIVE
GAD65 AB SER-SCNC: 0 NMOL/L
GFAP ALPHA IGG SER QL IF: NEGATIVE
GLIAL NUC TYPE 1 AB SER QL IF: NEGATIVE
HU1 AB SER QL: NEGATIVE
HU2 AB SER QL IF: NEGATIVE
HU3 AB SER QL: NEGATIVE
IGLON5 IGG SER QL CBA IFA: NEGATIVE
IMMUNOLOGIST REVIEW: NORMAL
LGI1 IGG SER QL CBA IFA: NEGATIVE
MGLUR1 IGG SER QL IF: NEGATIVE
NEUROCHONDRIN AB SERPL QL IF: NEGATIVE
NIF IGG SER QL IF: NEGATIVE
NMDAR1 IGG SER QL CBA IFA: NEGATIVE
PCA-1 AB SER QL IF: NEGATIVE
PCA-2 AB SER QL IF: NEGATIVE
PCA-TR AB SER QL IF: NEGATIVE
PDE10A AB IFA,S: NEGATIVE
SEPTIN-7 IGG SERPL QL IF: NEGATIVE
TRIM46 AB IFA,S: NEGATIVE

## 2025-07-03 ENCOUNTER — APPOINTMENT (OUTPATIENT)
Dept: PAIN MEDICINE | Facility: CLINIC | Age: 40
End: 2025-07-03
Payer: COMMERCIAL

## 2025-07-08 ENCOUNTER — APPOINTMENT (OUTPATIENT)
Dept: NEUROLOGY | Facility: HOSPITAL | Age: 40
End: 2025-07-08
Payer: COMMERCIAL

## 2025-07-14 ENCOUNTER — HOSPITAL ENCOUNTER (OUTPATIENT)
Dept: NEUROLOGY | Facility: CLINIC | Age: 40
Discharge: HOME | End: 2025-07-14
Payer: COMMERCIAL

## 2025-07-14 ENCOUNTER — APPOINTMENT (OUTPATIENT)
Dept: OPHTHALMOLOGY | Facility: CLINIC | Age: 40
End: 2025-07-14
Payer: COMMERCIAL

## 2025-07-14 DIAGNOSIS — R25.2 MUSCLE CRAMPS: ICD-10-CM

## 2025-07-14 DIAGNOSIS — R10.9 ABDOMINAL SPASMS: ICD-10-CM

## 2025-07-14 PROCEDURE — 95869 NDL EMG THRC PARASPINAL MUSC: CPT | Mod: 59 | Performed by: PSYCHIATRY & NEUROLOGY

## 2025-07-14 PROCEDURE — 95911 NRV CNDJ TEST 9-10 STUDIES: CPT | Performed by: PSYCHIATRY & NEUROLOGY

## 2025-07-14 PROCEDURE — 95869 NDL EMG THRC PARASPINAL MUSC: CPT | Performed by: PSYCHIATRY & NEUROLOGY

## 2025-07-14 PROCEDURE — 95886 MUSC TEST DONE W/N TEST COMP: CPT | Performed by: PSYCHIATRY & NEUROLOGY

## 2025-07-22 ENCOUNTER — LAB (OUTPATIENT)
Dept: LAB | Facility: CLINIC | Age: 40
End: 2025-07-22
Payer: COMMERCIAL

## 2025-07-22 DIAGNOSIS — D72.819 LEUKOPENIA, UNSPECIFIED TYPE: ICD-10-CM

## 2025-07-22 LAB
ALBUMIN SERPL BCP-MCNC: 4.4 G/DL (ref 3.4–5)
ALP SERPL-CCNC: 64 U/L (ref 33–120)
ALT SERPL W P-5'-P-CCNC: 47 U/L (ref 10–52)
ANION GAP SERPL CALC-SCNC: 12 MMOL/L (ref 10–20)
AST SERPL W P-5'-P-CCNC: 43 U/L (ref 9–39)
BASOPHILS # BLD AUTO: 0.02 X10*3/UL (ref 0–0.1)
BASOPHILS NFR BLD AUTO: 0.5 %
BILIRUB SERPL-MCNC: 0.8 MG/DL (ref 0–1.2)
BUN SERPL-MCNC: 25 MG/DL (ref 6–23)
CALCIUM SERPL-MCNC: 9.1 MG/DL (ref 8.6–10.3)
CHLORIDE SERPL-SCNC: 102 MMOL/L (ref 98–107)
CO2 SERPL-SCNC: 27 MMOL/L (ref 21–32)
CREAT SERPL-MCNC: 1.11 MG/DL (ref 0.5–1.3)
EGFRCR SERPLBLD CKD-EPI 2021: 86 ML/MIN/1.73M*2
EOSINOPHIL # BLD AUTO: 0.03 X10*3/UL (ref 0–0.7)
EOSINOPHIL NFR BLD AUTO: 0.7 %
ERYTHROCYTE [DISTWIDTH] IN BLOOD BY AUTOMATED COUNT: 13.8 % (ref 11.5–14.5)
FERRITIN SERPL-MCNC: 86 NG/ML (ref 20–300)
GLUCOSE SERPL-MCNC: 85 MG/DL (ref 74–99)
HCT VFR BLD AUTO: 41.1 % (ref 41–52)
HGB BLD-MCNC: 14.5 G/DL (ref 13.5–17.5)
IMM GRANULOCYTES # BLD AUTO: 0.01 X10*3/UL (ref 0–0.7)
IMM GRANULOCYTES NFR BLD AUTO: 0.2 % (ref 0–0.9)
IRON SATN MFR SERPL: 42 % (ref 25–45)
IRON SERPL-MCNC: 147 UG/DL (ref 35–150)
LYMPHOCYTES # BLD AUTO: 1.32 X10*3/UL (ref 1.2–4.8)
LYMPHOCYTES NFR BLD AUTO: 32.3 %
MCH RBC QN AUTO: 31.8 PG (ref 26–34)
MCHC RBC AUTO-ENTMCNC: 35.3 G/DL (ref 32–36)
MCV RBC AUTO: 90 FL (ref 80–100)
MONOCYTES # BLD AUTO: 0.39 X10*3/UL (ref 0.1–1)
MONOCYTES NFR BLD AUTO: 9.5 %
NEUTROPHILS # BLD AUTO: 2.32 X10*3/UL (ref 1.2–7.7)
NEUTROPHILS NFR BLD AUTO: 56.8 %
NRBC BLD-RTO: 0 /100 WBCS (ref 0–0)
PLATELET # BLD AUTO: 216 X10*3/UL (ref 150–450)
POTASSIUM SERPL-SCNC: 4 MMOL/L (ref 3.5–5.3)
PROT SERPL-MCNC: 6.5 G/DL (ref 6.4–8.2)
RBC # BLD AUTO: 4.56 X10*6/UL (ref 4.5–5.9)
SODIUM SERPL-SCNC: 137 MMOL/L (ref 136–145)
TIBC SERPL-MCNC: 346 UG/DL (ref 240–445)
UIBC SERPL-MCNC: 199 UG/DL (ref 110–370)
WBC # BLD AUTO: 4.1 X10*3/UL (ref 4.4–11.3)

## 2025-07-22 PROCEDURE — 36415 COLL VENOUS BLD VENIPUNCTURE: CPT

## 2025-07-22 PROCEDURE — 85025 COMPLETE CBC W/AUTO DIFF WBC: CPT

## 2025-07-22 PROCEDURE — 82607 VITAMIN B-12: CPT

## 2025-07-22 PROCEDURE — 82728 ASSAY OF FERRITIN: CPT

## 2025-07-22 PROCEDURE — 83540 ASSAY OF IRON: CPT

## 2025-07-22 PROCEDURE — 82306 VITAMIN D 25 HYDROXY: CPT

## 2025-07-22 PROCEDURE — 83550 IRON BINDING TEST: CPT

## 2025-07-22 PROCEDURE — 80053 COMPREHEN METABOLIC PANEL: CPT

## 2025-07-22 PROCEDURE — 84443 ASSAY THYROID STIM HORMONE: CPT

## 2025-07-23 LAB
25(OH)D3 SERPL-MCNC: 66 NG/ML (ref 30–100)
TSH SERPL-ACNC: 1.38 MIU/L (ref 0.44–3.98)
VIT B12 SERPL-MCNC: 622 PG/ML (ref 211–911)

## 2025-07-24 ENCOUNTER — APPOINTMENT (OUTPATIENT)
Dept: HEMATOLOGY/ONCOLOGY | Facility: CLINIC | Age: 40
End: 2025-07-24
Payer: COMMERCIAL

## 2025-07-29 ENCOUNTER — APPOINTMENT (OUTPATIENT)
Dept: NEUROLOGY | Facility: HOSPITAL | Age: 40
End: 2025-07-29
Payer: COMMERCIAL

## 2025-07-30 ENCOUNTER — APPOINTMENT (OUTPATIENT)
Dept: HEMATOLOGY/ONCOLOGY | Facility: CLINIC | Age: 40
End: 2025-07-30
Payer: COMMERCIAL

## 2025-08-05 ENCOUNTER — APPOINTMENT (OUTPATIENT)
Dept: RHEUMATOLOGY | Facility: CLINIC | Age: 40
End: 2025-08-05
Payer: COMMERCIAL

## 2025-08-07 ENCOUNTER — APPOINTMENT (OUTPATIENT)
Dept: PAIN MEDICINE | Facility: CLINIC | Age: 40
End: 2025-08-07
Payer: COMMERCIAL

## 2025-09-02 ENCOUNTER — APPOINTMENT (OUTPATIENT)
Dept: PAIN MEDICINE | Facility: CLINIC | Age: 40
End: 2025-09-02
Payer: COMMERCIAL

## 2025-10-07 ENCOUNTER — APPOINTMENT (OUTPATIENT)
Dept: NEUROLOGY | Facility: HOSPITAL | Age: 40
End: 2025-10-07
Payer: COMMERCIAL

## 2025-12-03 ENCOUNTER — APPOINTMENT (OUTPATIENT)
Dept: UROLOGY | Facility: CLINIC | Age: 40
End: 2025-12-03
Payer: COMMERCIAL